# Patient Record
Sex: FEMALE | Race: WHITE | NOT HISPANIC OR LATINO | Employment: OTHER | URBAN - METROPOLITAN AREA
[De-identification: names, ages, dates, MRNs, and addresses within clinical notes are randomized per-mention and may not be internally consistent; named-entity substitution may affect disease eponyms.]

---

## 2023-11-15 NOTE — PROGRESS NOTES
Darcy Kwon female   1955 68 y.o.   [id number]      Chief Complaint  Annual mammogram and exam, BRCA 2 positive, high risk surveillance care    History Of Present Illness  Darcy Kwon is a very pleasant 68 year old  woman [facility]. 2023 she underwent genetic testing, 47 gene panel, demonstrating BRCA 2 gene mutation and a VUS in POLD1. She has family history of breast cancer in her mother, age 49. She denies breast surgery or biopsy. She is undecided on bilateral prophylactic mastectomy, but may consider in the future. She had an ICD placed in 2016 due to low cardiac fraction. She is unable to have an MRI. She follows with bilateral screening ultrasounds. She started Tamoxifen 20mg daily in 2023 for risk reduction, currently taking and tolerating well. She had a BSO, 2023, benign. She presents today for annual mammogram and exam. She denies any new masses or lumps.      BREAST IMAGIN2023 Bilateral screening ultrasound, indicates BI-RADS Category 1. 2022 [facility] Bilateral screening mammogram, indicates BI-RADS Category 1.      FEMALE HISTORY: menarche age 13, , first birth age 22, did not breastfeed, OCP's x 8 years, natural menopause age 54, BSO in 2023, benign, no HRT, almost entirely fatty tissue     FAMILY CANCER HISTORY:  Mother: Breast cancer, age 49  Maternal cousin: Breast cancer, BRCA2+  Father: Head and Neck cancer      Surgical History  She has a past surgical history that includes Other surgical history (2021); Other surgical history (2021); Other surgical history (2021); Other surgical history (2021); Other surgical history (2021); and Other surgical history (2021).     Social History  She has no history on file for tobacco use, alcohol use, and drug use.    Family History  Family History   Problem Relation Name Age of Onset    Breast cancer Mother       Ovarian cancer Mother      Breast cancer Mother's Sister          Allergies  Atorvastatin    Medications  Current Outpatient Medications   Medication Instructions    albuterol 90 mcg/actuation inhaler inhalation, Every 4 hours RT    Altace 10 mg capsule 1 capsule, oral, Daily    CALCIUM CARBONATE-VITAMIN D3 ORAL 1 tablet, oral, 2 times daily    carvedilol (Coreg) 12.5 mg tablet 1 tablet, oral, 2 times daily    cholecalciferol (Vitamin D-3) 25 MCG (1000 UT) tablet 1 tablet, oral, Daily    cycloSPORINE (Restasis) 0.05 % ophthalmic emulsion ophthalmic (eye), Every 12 hours    fluticasone propion-salmeteroL (Advair Diskus) 100-50 mcg/dose diskus inhaler 1 puff, inhalation, Every 12 hours    furosemide (Lasix) 20 mg tablet oral    glucosamine sulfate 500 mg capsule oral    magnesium oxide (Mag-Ox) 400 mg (241.3 mg magnesium) tablet 1 tablet, oral, Daily    mometasone (Nasonex) 50 mcg/actuation nasal spray nasal    multivitamin (Daily Multi-Vitamin) tablet 1 tablet, oral, Daily    omeprazole (PriLOSEC) 40 mg DR capsule oral    predniSONE (Deltasone) 10 mg tablet     rosuvastatin (Crestor) 10 mg tablet 1 tablet, oral, Daily    spironolactone (Aldactone) 25 mg tablet 1 tablet, oral, Daily         REVIEW OF SYSTEMS    Constitutional:  Negative for appetite change, fatigue, fever and unexpected weight change.   HENT:  Negative for ear pain, hearing loss, nosebleeds, sore throat and trouble swallowing.    Eyes:  Negative for discharge, itching and visual disturbance.   Respiratory:  Negative for cough, chest tightness and shortness of breath.    Cardiovascular:  Negative for chest pain, palpitations and leg swelling.   Breast: as indicated in HPI  Gastrointestinal:  Negative for abdominal pain, constipation, diarrhea and nausea.   Endocrine: Negative for cold intolerance and heat intolerance.   Genitourinary:  Negative for dysuria, frequency, hematuria, pelvic pain and vaginal bleeding.   Musculoskeletal:  Negative for  arthralgias, back pain, gait problem, joint swelling and myalgias.   Skin:  Negative for color change and rash.   Allergic/Immunologic: Negative for environmental allergies and food allergies.   Neurological:  Negative for dizziness, tremors, speech difficulty, weakness, numbness and headaches.   Hematological:  Does not bruise/bleed easily.   Psychiatric/Behavioral:  Negative for agitation, dysphoric mood and sleep disturbance. The patient is not nervous/anxious.         Past Medical History  She has no past medical history on file.     Physical Exam  Patient is alert and oriented x3 and in a relaxed and appropriate mood. Her gait is steady and hand grasps are equal. Sclera is clear. The breasts are nearly symmetrical. The tissue is dense in the central quadrants and softer around without palpable abnormalities, discrete nodules or masses. The skin and nipples appear normal. There is no cervical, supraclavicular or axillary lymphadenopathy. Heart rate and rhythm normal, S1 and S2 appreciated. The lungs are clear to auscultation bilaterally. Abdomen is soft and non-tender. There is a well-healed incision upper left chest wall with cardiac device in place.     Physical Exam  Chest:              Last Recorded Vitals  Vitals:    11/28/23 1152   BP: 133/81   Pulse: 85   Resp: 16       Relevant Results   Time was spent discussing digital images of the radiology testing with the patient. I explained the results in depth, along with suggested explanation for follow up recommendations based on the testing results. BI-RADS Category 0     Imaging  Narrative & Impression   Interpreted By:  Caroline Rincon,   STUDY:  BI MAMMO BILATERAL SCREENING TOMOSYNTHESIS;  11/28/2023 10:45 am      ACCESSION NUMBER(S):  [id number]      ORDERING CLINICIAN:  ORLANDO CERRATO      INDICATION:  Screening. BRCA2. Family history of breast and ovarian cancer.      COMPARISON:  None available. Priors have been requested.      FINDINGS:  2D and  tomosynthesis images were reviewed at 1 mm slice thickness.      Density:  There are areas of scattered fibroglandular tissue.      A cardiac device overlies the left axilla, limiting evaluation. There  are calcifications in the upper outer left breast at posterior depth.  No suspicious masses or calcifications are identified.      IMPRESSION:  No mammographic evidence of malignancy in the right breast. Left  breast calcifications.      BI-RADS CATEGORY:      BI-RADS Category:  0 Incomplete; Need Additional Imaging Evaluation  and/or Prior Mammograms for Comparison. Recommendation:  Additional  Imaging Diagnostic Mammogram. Recommended Date:  Immediate.  Laterality:  Left.     Narrative & Impression   Interpreted By:  Caroline Rincon,   STUDY:  BI MAMMO LEFT DIAGNOSTIC;  11/28/2023 11:43 am      ACCESSION NUMBER(S):  [id number]      ORDERING CLINICIAN:  ORLANDO CERRATO      INDICATION:  Recall from same day screening mammogram for left breast  calcifications.      COMPARISON:  None available. Priors have been requested.      FINDINGS:  Density:  There are areas of scattered fibroglandular tissue.      There are loosely grouped round calcifications in the upper outer  quadrant at posterior depth. No suspicious masses or calcifications  are identified.      IMPRESSION:  Left breast calcifications. Recommendation is for comparison with  outside prior mammograms when available.      BI-RADS CATEGORY:      BI-RADS Category:  0 Incomplete; Need Additional Imaging Evaluation  and/or Prior Mammograms for Comparison. Recommendation:  Obtain Prior  Study for Comparison. Recommended Date:  Immediate.  Laterality:  Left.       Assessment/Plan   High risk surveillance care, normal clinical exam and imaging, BRCA 2 positive, VUS in POLD1 gene, family history of breast cancer, almost entirely fatty tissue, but centrally dense on exam     Plan: Return in July 2024 for bilateral screening ultrasound and office visit. Will  alternate screening ultrasound/mammogram due to ICD. Continue Tamoxifen 20mg daily. Will call once addendum is placed.     Patient Discussion/Summary  Your clinical examination and imaging are stable. Please return in July 2024 for bilateral screening ultrasound and office visit or sooner if you have any problems or concerns. Continue Tamoxifen 20mg daily. I will call you with the recommendations once we have received your outside breast imaging.     You can see your health information, review clinical summaries from office visits & test results online when you follow your health with MY  Chart, a personal health record. To sign up go to [email or web address]. If you need assistance with signing up or trouble getting into your account call [employer] Patient Line 24/7 at [phone].    My office phone number is [phone] if you need to get in touch with me or have additional questions or concerns. Thank you for choosing [facility] and trusting me as your healthcare provider. I look forward to seeing you again at your next office visit. I am honored to be a provider on your health care team and I remain dedicated to helping you achieve your health goals.      Olive Rocha, APRN-CNP

## 2023-11-16 PROBLEM — R06.02 SHORTNESS OF BREATH: Status: ACTIVE | Noted: 2023-11-16

## 2023-11-16 PROBLEM — E78.5 DYSLIPIDEMIA (HIGH LDL; LOW HDL): Status: ACTIVE | Noted: 2023-11-16

## 2023-11-16 PROBLEM — T82.198A MECHANICAL COMPLICATION OF IMPLANTED CARDIAC DEFIBRILLATOR: Status: ACTIVE | Noted: 2023-11-16

## 2023-11-16 PROBLEM — I42.0 DILATED CARDIOMYOPATHY (MULTI): Status: ACTIVE | Noted: 2023-11-16

## 2023-11-16 PROBLEM — Z95.810 ICD (IMPLANTABLE CARDIOVERTER-DEFIBRILLATOR), BIVENTRICULAR, IN SITU: Status: ACTIVE | Noted: 2023-11-16

## 2023-11-16 PROBLEM — E66.9 OBESITY: Status: ACTIVE | Noted: 2023-11-16

## 2023-11-16 PROBLEM — U07.1 DISEASE DUE TO SEVERE ACUTE RESPIRATORY SYNDROME CORONAVIRUS 2 (SARS-COV-2): Status: ACTIVE | Noted: 2023-11-16

## 2023-11-16 PROBLEM — I50.9 CHF NYHA CLASS III (MULTI): Status: ACTIVE | Noted: 2023-11-16

## 2023-11-16 PROBLEM — I44.7 LBBB (LEFT BUNDLE BRANCH BLOCK): Status: ACTIVE | Noted: 2023-11-16

## 2023-11-16 RX ORDER — FLUTICASONE PROPIONATE AND SALMETEROL 100; 50 UG/1; UG/1
1 POWDER RESPIRATORY (INHALATION) EVERY 12 HOURS
COMMUNITY
End: 2023-12-12 | Stop reason: ALTCHOICE

## 2023-11-16 RX ORDER — CHOLECALCIFEROL (VITAMIN D3) 25 MCG
1 TABLET ORAL DAILY
COMMUNITY

## 2023-11-16 RX ORDER — RAMIPRIL 10 MG/1
1 CAPSULE ORAL DAILY
COMMUNITY

## 2023-11-16 RX ORDER — ROSUVASTATIN CALCIUM 10 MG/1
1 TABLET, COATED ORAL DAILY
COMMUNITY
End: 2024-06-10 | Stop reason: SDUPTHER

## 2023-11-16 RX ORDER — CARVEDILOL 12.5 MG/1
1 TABLET ORAL 2 TIMES DAILY
COMMUNITY
Start: 2022-04-01 | End: 2024-06-10 | Stop reason: SDUPTHER

## 2023-11-16 RX ORDER — PETROLATUM,WHITE/LANOLIN
OINTMENT (GRAM) TOPICAL
COMMUNITY
End: 2023-11-28 | Stop reason: WASHOUT

## 2023-11-16 RX ORDER — CYCLOSPORINE 0.5 MG/ML
EMULSION OPHTHALMIC EVERY 12 HOURS
COMMUNITY
End: 2023-12-12 | Stop reason: WASHOUT

## 2023-11-16 RX ORDER — MOMETASONE FUROATE 50 UG/1
SPRAY, METERED NASAL
COMMUNITY

## 2023-11-16 RX ORDER — ALBUTEROL SULFATE 90 UG/1
AEROSOL, METERED RESPIRATORY (INHALATION)
COMMUNITY

## 2023-11-16 RX ORDER — FUROSEMIDE 20 MG/1
TABLET ORAL
COMMUNITY
End: 2024-06-10 | Stop reason: SDUPTHER

## 2023-11-16 RX ORDER — LANOLIN ALCOHOL/MO/W.PET/CERES
1 CREAM (GRAM) TOPICAL DAILY
COMMUNITY
Start: 2022-06-26 | End: 2024-04-25

## 2023-11-16 RX ORDER — MULTIVITAMIN
1 TABLET ORAL DAILY
COMMUNITY

## 2023-11-16 RX ORDER — OMEPRAZOLE 40 MG/1
CAPSULE, DELAYED RELEASE ORAL
COMMUNITY

## 2023-11-16 RX ORDER — PREDNISONE 10 MG/1
TABLET ORAL
COMMUNITY
Start: 2023-03-29 | End: 2023-11-28 | Stop reason: WASHOUT

## 2023-11-16 RX ORDER — SPIRONOLACTONE 25 MG/1
1 TABLET ORAL DAILY
COMMUNITY
Start: 2021-10-07 | End: 2024-06-10 | Stop reason: SDUPTHER

## 2023-11-28 ENCOUNTER — HOSPITAL ENCOUNTER (OUTPATIENT)
Dept: RADIOLOGY | Facility: HOSPITAL | Age: 68
Discharge: HOME | End: 2023-11-28
Payer: MEDICARE

## 2023-11-28 ENCOUNTER — OFFICE VISIT (OUTPATIENT)
Dept: SURGICAL ONCOLOGY | Facility: HOSPITAL | Age: 68
End: 2023-11-28
Payer: MEDICARE

## 2023-11-28 VITALS
BODY MASS INDEX: 32.2 KG/M2 | HEART RATE: 85 BPM | RESPIRATION RATE: 16 BRPM | WEIGHT: 175 LBS | SYSTOLIC BLOOD PRESSURE: 133 MMHG | HEIGHT: 62 IN | DIASTOLIC BLOOD PRESSURE: 81 MMHG

## 2023-11-28 VITALS — HEIGHT: 61 IN | BODY MASS INDEX: 35.5 KG/M2 | WEIGHT: 188 LBS

## 2023-11-28 DIAGNOSIS — Z15.01 GENETIC SUSCEPTIBILITY TO MALIGNANT NEOPLASM OF BREAST: ICD-10-CM

## 2023-11-28 DIAGNOSIS — Z12.39 ENCOUNTER FOR OTHER SCREENING FOR MALIGNANT NEOPLASM OF BREAST: ICD-10-CM

## 2023-11-28 DIAGNOSIS — R92.8 ABNORMAL MAMMOGRAM: ICD-10-CM

## 2023-11-28 DIAGNOSIS — Z15.09 GENETIC SUSCEPTIBILITY TO OTHER MALIGNANT NEOPLASM: ICD-10-CM

## 2023-11-28 DIAGNOSIS — R92.1 CALCIFICATION OF LEFT BREAST: ICD-10-CM

## 2023-11-28 DIAGNOSIS — Z12.39 BREAST CANCER SCREENING, HIGH RISK PATIENT: Primary | ICD-10-CM

## 2023-11-28 PROCEDURE — 99213 OFFICE O/P EST LOW 20 MIN: CPT | Mod: PO | Performed by: NURSE PRACTITIONER

## 2023-11-28 PROCEDURE — 77063 BREAST TOMOSYNTHESIS BI: CPT

## 2023-11-28 PROCEDURE — 1159F MED LIST DOCD IN RCRD: CPT | Performed by: NURSE PRACTITIONER

## 2023-11-28 PROCEDURE — 77067 SCR MAMMO BI INCL CAD: CPT | Mod: BILATERAL PROCEDURE | Performed by: RADIOLOGY

## 2023-11-28 PROCEDURE — 77063 BREAST TOMOSYNTHESIS BI: CPT | Mod: BILATERAL PROCEDURE | Performed by: RADIOLOGY

## 2023-11-28 PROCEDURE — 77067 SCR MAMMO BI INCL CAD: CPT

## 2023-11-28 PROCEDURE — 99213 OFFICE O/P EST LOW 20 MIN: CPT | Performed by: NURSE PRACTITIONER

## 2023-11-28 PROCEDURE — 77065 DX MAMMO INCL CAD UNI: CPT | Mod: LT

## 2023-11-28 PROCEDURE — 77065 DX MAMMO INCL CAD UNI: CPT | Mod: LEFT SIDE | Performed by: RADIOLOGY

## 2023-11-28 NOTE — PATIENT INSTRUCTIONS
Your clinical examination and imaging are stable. Please return in July 2024 for bilateral screening ultrasound and office visit or sooner if you have any problems or concerns. Continue Tamoxifen 20mg daily. I will call you with the recommendations once we have received your outside breast imaging.     You can see your health information, review clinical summaries from office visits & test results online when you follow your health with MY  Chart, a personal health record. To sign up go to [email or web address]. If you need assistance with signing up or trouble getting into your account call [employer] Patient Line 24/7 at [phone].    My office phone number is [phone] if you need to get in touch with me or have additional questions or concerns. Thank you for choosing [facility] and trusting me as your healthcare provider. I look forward to seeing you again at your next office visit. I am honored to be a provider on your health care team and I remain dedicated to helping you achieve your health goals.

## 2023-12-04 ENCOUNTER — HOSPITAL ENCOUNTER (OUTPATIENT)
Dept: RADIOLOGY | Facility: EXTERNAL LOCATION | Age: 68
Discharge: HOME | End: 2023-12-04

## 2023-12-05 ENCOUNTER — TELEPHONE (OUTPATIENT)
Dept: SURGERY | Facility: HOSPITAL | Age: 68
End: 2023-12-05
Payer: MEDICARE

## 2023-12-05 NOTE — TELEPHONE ENCOUNTER
Spoke with Darcy regarding addendum to mammogram results. Left breast biopsy recommended. She is scheduled for 12/14/2023.

## 2023-12-12 ENCOUNTER — HOSPITAL ENCOUNTER (OUTPATIENT)
Dept: CARDIOLOGY | Facility: HOSPITAL | Age: 68
Discharge: HOME | End: 2023-12-12
Payer: MEDICARE

## 2023-12-12 ENCOUNTER — OFFICE VISIT (OUTPATIENT)
Dept: CARDIOLOGY | Facility: CLINIC | Age: 68
End: 2023-12-12
Payer: MEDICARE

## 2023-12-12 VITALS
DIASTOLIC BLOOD PRESSURE: 76 MMHG | SYSTOLIC BLOOD PRESSURE: 126 MMHG | HEART RATE: 70 BPM | WEIGHT: 189 LBS | HEIGHT: 62 IN | BODY MASS INDEX: 34.78 KG/M2

## 2023-12-12 DIAGNOSIS — Z95.810 ICD (IMPLANTABLE CARDIOVERTER-DEFIBRILLATOR), BIVENTRICULAR, IN SITU: Primary | ICD-10-CM

## 2023-12-12 DIAGNOSIS — I47.29 PAROXYSMAL VENTRICULAR TACHYCARDIA (MULTI): ICD-10-CM

## 2023-12-12 DIAGNOSIS — Z01.810 PRE-OPERATIVE CARDIOVASCULAR EXAMINATION, ICD IN PLACE: ICD-10-CM

## 2023-12-12 DIAGNOSIS — Z71.89 ENCOUNTER TO DISCUSS TREATMENT OPTIONS: ICD-10-CM

## 2023-12-12 DIAGNOSIS — I42.0 DILATED CARDIOMYOPATHY (MULTI): ICD-10-CM

## 2023-12-12 DIAGNOSIS — I42.8 NONISCHEMIC CARDIOMYOPATHY (MULTI): ICD-10-CM

## 2023-12-12 DIAGNOSIS — Z95.810 PRE-OPERATIVE CARDIOVASCULAR EXAMINATION, ICD IN PLACE: ICD-10-CM

## 2023-12-12 DIAGNOSIS — E78.5 DYSLIPIDEMIA (HIGH LDL; LOW HDL): ICD-10-CM

## 2023-12-12 DIAGNOSIS — I44.7 LBBB (LEFT BUNDLE BRANCH BLOCK): ICD-10-CM

## 2023-12-12 DIAGNOSIS — Z71.89 ENCOUNTER FOR MEDICATION REVIEW AND COUNSELING: ICD-10-CM

## 2023-12-12 DIAGNOSIS — Z87.891 FORMER SMOKER: ICD-10-CM

## 2023-12-12 DIAGNOSIS — I50.9 CONGESTIVE HEART FAILURE, NYHA CLASS 3, UNSPECIFIED CONGESTIVE HEART FAILURE TYPE (MULTI): ICD-10-CM

## 2023-12-12 PROCEDURE — 93290 INTERROG DEV EVAL ICPMS IP: CPT | Performed by: INTERNAL MEDICINE

## 2023-12-12 PROCEDURE — 93000 ELECTROCARDIOGRAM COMPLETE: CPT | Performed by: INTERNAL MEDICINE

## 2023-12-12 PROCEDURE — 1159F MED LIST DOCD IN RCRD: CPT | Performed by: INTERNAL MEDICINE

## 2023-12-12 PROCEDURE — 1036F TOBACCO NON-USER: CPT | Performed by: INTERNAL MEDICINE

## 2023-12-12 PROCEDURE — 99214 OFFICE O/P EST MOD 30 MIN: CPT | Performed by: INTERNAL MEDICINE

## 2023-12-12 PROCEDURE — 93283 PRGRMG EVAL IMPLANTABLE DFB: CPT | Performed by: INTERNAL MEDICINE

## 2023-12-12 PROCEDURE — 93283 PRGRMG EVAL IMPLANTABLE DFB: CPT

## 2023-12-12 PROCEDURE — 3008F BODY MASS INDEX DOCD: CPT | Performed by: INTERNAL MEDICINE

## 2023-12-12 RX ORDER — TAMOXIFEN CITRATE 20 MG/1
20 TABLET ORAL DAILY
COMMUNITY

## 2023-12-12 ASSESSMENT — ENCOUNTER SYMPTOMS
PALPITATIONS: 0
DYSPNEA ON EXERTION: 0
WHEEZING: 0

## 2023-12-12 NOTE — PROGRESS NOTES
"Chief Complaint:   Follow-up (6 month w/ device check)     History Of Present Illness:    Darcy Kwon is a 68 y.o. female presenting with follow-up.     She feels okay.  She denies any cardiac symptoms.  She denies any lightheadedness, near-syncope, syncope, or chest discomfort.    We discussed that her device is nearing replacement indicator.  We did review what future generator change involves.    Last Recorded Vitals:  Vitals:    12/12/23 1422   BP: 126/76   BP Location: Left arm   Patient Position: Sitting   Pulse: 70   Weight: 85.7 kg (189 lb)   Height: 1.575 m (5' 2\")       Past Medical History:  See List    Past Surgical History:  See List      Social History:  She reports that she has quit smoking. Her smoking use included cigarettes. She has never used smokeless tobacco. She reports current alcohol use. She reports that she does not use drugs.    Family History:  Family History   Problem Relation Name Age of Onset    Breast cancer Mother      Ovarian cancer Mother      Breast cancer Mother's Sister          Allergies:  Atorvastatin    Outpatient Medications:  Current Outpatient Medications   Medication Instructions    albuterol 90 mcg/actuation inhaler inhalation, Every 4 hours RT    Altace 10 mg capsule 1 capsule, oral, Daily    CALCIUM CARBONATE-VITAMIN D3 ORAL 1 tablet, oral, 2 times daily    carvedilol (Coreg) 12.5 mg tablet 1 tablet, oral, 2 times daily    cholecalciferol (Vitamin D-3) 25 MCG (1000 UT) tablet 1 tablet, oral, Daily    fluticasone propion-salmeteroL (Advair Diskus) 100-50 mcg/dose diskus inhaler 1 puff, inhalation, Every 12 hours    furosemide (Lasix) 20 mg tablet oral    magnesium oxide (Mag-Ox) 400 mg (241.3 mg magnesium) tablet 1 tablet, oral, Daily    mometasone (Nasonex) 50 mcg/actuation nasal spray nasal    multivitamin (Daily Multi-Vitamin) tablet 1 tablet, oral, Daily    omeprazole (PriLOSEC) 40 mg DR capsule oral    rosuvastatin (Crestor) 10 mg tablet 1 tablet, oral, Daily    " "spironolactone (Aldactone) 25 mg tablet 1 tablet, oral, Daily    tamoxifen (NOLVADEX) 20 mg, oral, Daily, Take with water or any other nonalcoholic drink with or without food at around the same time(s) every day.   Review of Systems   Cardiovascular:  Negative for chest pain, dyspnea on exertion and palpitations.   Respiratory:  Negative for wheezing.    All other systems reviewed and are negative.    Physical Exam:  Constitutional:       General: Awake.      Appearance: Normal and healthy appearance. Well-developed and not in distress.   Neck:      Vascular: No JVR. JVD normal.   Pulmonary:      Effort: Pulmonary effort is normal.      Breath sounds: Normal breath sounds. No wheezing. No rhonchi. No rales.   Chest:      Chest wall: Not tender to palpatation.      Comments: Left sided device pocket- healed and well approximated. No lump or hematoma     Cardiovascular:      PMI at left midclavicular line. Normal rate. Regular rhythm. Normal S1. Normal S2.       Murmurs: There is no murmur.      No gallop.  No click. No rub.   Pulses:     Intact distal pulses.   Edema:     Peripheral edema absent.   Abdominal:      Tenderness: There is no abdominal tenderness.   Musculoskeletal: Normal range of motion.         General: No tenderness. Skin:     General: Skin is warm and dry.   Neurological:      General: No focal deficit present.      Mental Status: Alert and oriented to person, place and time.            Last Labs:  CBC -  No results found for: \"WBC\", \"HGB\", \"HCT\", \"MCV\", \"PLT\"    CMP -  No results found for: \"CALCIUM\", \"PHOS\", \"PROT\", \"ALBUMIN\", \"AST\", \"ALT\", \"ALKPHOS\", \"BILITOT\"    LIPID PANEL -   No results found for: \"CHOL\", \"TRIG\", \"HDL\", \"CHHDL\", \"LDLF\", \"VLDL\", \"NHDL\"    RENAL FUNCTION PANEL -   No results found for: \"GLUCOSE\", \"NA\", \"K\", \"CL\", \"CO2\", \"ANIONGAP\", \"BUN\", \"CREATININE\", \"GFRMALE\", \"CALCIUM\", \"PHOS\", \"ALBUMIN\"     No results found for: \"BNP\", \"HGBA1C\"    Last Cardiology Tests:       Device Check: " Today. Medtronic DTM B1 QQ biventricular ICD. Estimate longevity device 7 months. Medtronic alert device. 99.9 % biventricular pacing.  0 VTECG:  ECG: Today. Appropriate sensing and pacing.  Normal axis.  Corrected QT interval 490 ms       Lab review: I have personally reviewed the laboratory result(s) see above    Assessment/Plan   Diagnoses and all orders for this visit:  Dilated cardiomyopathy (CMS/HCC)  LBBB (left bundle branch block)  ICD (implantable cardioverter-defibrillator), biventricular, in situ  Dyslipidemia (high LDL; low HDL)  Congestive heart failure, NYHA class 3, unspecified congestive heart failure type (CMS/HCC)  Former smoker  BMI 34.0-34.9,adult  Encounter for medication review and counseling  Encounter to discuss treatment options      Refractory heart failure, primary prevention ICD, underlying left bundle-branch block and sinus node dysfunction, s/p biventricular ICD in 2016. Chronic. Stable. Marked improvement in chronic systolic heart failure symptoms after biv ICD. July 2021 stress nuclear test with LVEF 54%.  Medtronic [id number] CRT-D, Field alert device. Reviewed device check. Previously adjusted AV VV optimization by echocardiographic guidance. Normal Optivol.  Changed to monthly device checks.  Discussed eventual future generator change.  Will have patient see Daxa Parrish NP in a few months to assess for generator replacement.    Chronic systolic heart failure . NYHA II C HF. Chronic. Stable. Reviewed medications. Minimal symptoms with current optimal HF meds. Continue meds.  Discussed refill  Hypertension, benign, chronic, controlled. Stable. Reviewed medications.  Discussed refills  Hyperlipidemia. Chronic. Stable. Reviewed medications. Reviewed last blood work.  History of atypical throat and chest pain, normal perfusion by nuclear stress test 2021. Chronic. Stable.  History of vein ligation. Chronic right lower extremity edema. Stable. Chronic.  Overweight. Reviewed diet  and exercise. Reinforced behavior modification        AHA recommendations for exercise, diet, and behavioral modification reviewed with pt.     The patient and I discussed the mechanism of arrhythmia, ventricular device, device function, field alert, ECG, device nearing MARY, uses sufficiency, compression stockings which she declines, indications for and types of medications, discussion if and what medication refills needed, treatment options, risks, benefits, and imponderables. American Heart Association lifestyle changes and behavioral modification discussed. All questions answered in detail. Counseling over 50% visit regarding above. Patient appreciative of care.       Yareli Ochoa MD

## 2023-12-12 NOTE — PATIENT INSTRUCTIONS
Continue same medications/treatment.  Patient educated on proper medication use.  Patient educated on risk factor modification.  Please bring any lab results from other providers/physicians to your next appointment.    Please bring all medicines, vitamins, and herbal supplements with you when you come to the office.    Prescriptions will not be filled unless you are compliant with your follow up appointments or have a follow up appointment scheduled as per instruction of your physician. Refills should be requested at the time of your visit.    Follow up with Daxa in 4 months with device check  Follow up with Dr. Ingram in 12 months with device check  Continue remote checks every 2 months until replacement     I, EUGENIO SADLER RN, AM SCRIBING FOR AND IN THE PRESENCE OF DR. JOSE INGRAM MD, FACC, FACP, FHPS

## 2023-12-14 ENCOUNTER — APPOINTMENT (OUTPATIENT)
Dept: SURGICAL ONCOLOGY | Facility: HOSPITAL | Age: 68
End: 2023-12-14
Payer: MEDICARE

## 2023-12-14 ENCOUNTER — HOSPITAL ENCOUNTER (OUTPATIENT)
Dept: RADIOLOGY | Facility: HOSPITAL | Age: 68
Discharge: HOME | End: 2023-12-14
Payer: MEDICARE

## 2023-12-14 DIAGNOSIS — R92.8 ABNORMAL MAMMOGRAM: ICD-10-CM

## 2023-12-14 PROCEDURE — 88305 TISSUE EXAM BY PATHOLOGIST: CPT | Mod: TC,SUR,STJLAB | Performed by: SURGERY

## 2023-12-14 PROCEDURE — 77065 DX MAMMO INCL CAD UNI: CPT | Mod: LT

## 2023-12-14 PROCEDURE — 2720000007 HC OR 272 NO HCPCS

## 2023-12-14 PROCEDURE — 19081 BX BREAST 1ST LESION STRTCTC: CPT | Mod: LEFT SIDE | Performed by: RADIOLOGY

## 2023-12-14 PROCEDURE — 2500000005 HC RX 250 GENERAL PHARMACY W/O HCPCS: Performed by: RADIOLOGY

## 2023-12-14 PROCEDURE — 19081 BX BREAST 1ST LESION STRTCTC: CPT | Mod: LT

## 2023-12-14 PROCEDURE — 96372 THER/PROPH/DIAG INJ SC/IM: CPT | Performed by: RADIOLOGY

## 2023-12-14 PROCEDURE — 88305 TISSUE EXAM BY PATHOLOGIST: CPT | Performed by: PATHOLOGY

## 2023-12-14 PROCEDURE — 2780000003 HC OR 278 NO HCPCS

## 2023-12-14 PROCEDURE — 77065 DX MAMMO INCL CAD UNI: CPT | Mod: LEFT SIDE | Performed by: RADIOLOGY

## 2023-12-14 PROCEDURE — A4648 IMPLANTABLE TISSUE MARKER: HCPCS

## 2023-12-14 RX ADMIN — Medication 10 ML: at 14:27

## 2023-12-19 ENCOUNTER — TELEPHONE (OUTPATIENT)
Dept: SURGERY | Facility: HOSPITAL | Age: 68
End: 2023-12-19
Payer: MEDICARE

## 2023-12-19 LAB
LABORATORY COMMENT REPORT: NORMAL
PATH REPORT.FINAL DX SPEC: NORMAL
PATH REPORT.GROSS SPEC: NORMAL
PATH REPORT.RELEVANT HX SPEC: NORMAL
PATH REPORT.TOTAL CANCER: NORMAL

## 2023-12-19 NOTE — TELEPHONE ENCOUNTER
Spoke with Darcy regarding left breast biopsy results, benign. Awaiting concordance report, patient aware. Will return in July 2024 as previously recommended and scheduled. She was told she can move forward with MRI despite her ICD. We will discuss again at next visit.

## 2024-01-16 ENCOUNTER — HOSPITAL ENCOUNTER (OUTPATIENT)
Dept: CARDIOLOGY | Facility: HOSPITAL | Age: 69
Discharge: HOME | End: 2024-01-16
Payer: MEDICARE

## 2024-01-16 DIAGNOSIS — Z95.810 ICD (IMPLANTABLE CARDIOVERTER-DEFIBRILLATOR), BIVENTRICULAR, IN SITU: ICD-10-CM

## 2024-02-20 ENCOUNTER — HOSPITAL ENCOUNTER (OUTPATIENT)
Dept: CARDIOLOGY | Facility: HOSPITAL | Age: 69
Discharge: HOME | End: 2024-02-20
Payer: MEDICARE

## 2024-02-20 DIAGNOSIS — Z95.810 ICD (IMPLANTABLE CARDIOVERTER-DEFIBRILLATOR), BIVENTRICULAR, IN SITU: ICD-10-CM

## 2024-04-09 ENCOUNTER — OFFICE VISIT (OUTPATIENT)
Dept: CARDIOLOGY | Facility: CLINIC | Age: 69
End: 2024-04-09
Payer: MEDICARE

## 2024-04-09 ENCOUNTER — HOSPITAL ENCOUNTER (OUTPATIENT)
Dept: CARDIOLOGY | Facility: HOSPITAL | Age: 69
Discharge: HOME | End: 2024-04-09
Payer: MEDICARE

## 2024-04-09 VITALS
HEART RATE: 73 BPM | BODY MASS INDEX: 34.6 KG/M2 | DIASTOLIC BLOOD PRESSURE: 68 MMHG | WEIGHT: 188 LBS | SYSTOLIC BLOOD PRESSURE: 108 MMHG | HEIGHT: 62 IN

## 2024-04-09 DIAGNOSIS — Z87.891 FORMER SMOKER: ICD-10-CM

## 2024-04-09 DIAGNOSIS — I50.9 CONGESTIVE HEART FAILURE, NYHA CLASS 3, UNSPECIFIED CONGESTIVE HEART FAILURE TYPE (MULTI): ICD-10-CM

## 2024-04-09 DIAGNOSIS — I44.7 LBBB (LEFT BUNDLE BRANCH BLOCK): ICD-10-CM

## 2024-04-09 DIAGNOSIS — R06.02 SHORTNESS OF BREATH: ICD-10-CM

## 2024-04-09 DIAGNOSIS — Z95.810 ICD (IMPLANTABLE CARDIOVERTER-DEFIBRILLATOR), BIVENTRICULAR, IN SITU: ICD-10-CM

## 2024-04-09 DIAGNOSIS — I42.0 DILATED CARDIOMYOPATHY (MULTI): ICD-10-CM

## 2024-04-09 DIAGNOSIS — E78.5 DYSLIPIDEMIA (HIGH LDL; LOW HDL): ICD-10-CM

## 2024-04-09 DIAGNOSIS — Z95.810 ELECTIVE REPLACEMENT OF IMPLANTABLE CARDIOVERTER-DEFIBRILLATOR (ICD) BATTERY REQUIRED: Primary | ICD-10-CM

## 2024-04-09 PROCEDURE — 93284 PRGRMG EVAL IMPLANTABLE DFB: CPT | Performed by: INTERNAL MEDICINE

## 2024-04-09 PROCEDURE — 99214 OFFICE O/P EST MOD 30 MIN: CPT | Performed by: NURSE PRACTITIONER

## 2024-04-09 PROCEDURE — 1036F TOBACCO NON-USER: CPT | Performed by: NURSE PRACTITIONER

## 2024-04-09 PROCEDURE — 3008F BODY MASS INDEX DOCD: CPT | Performed by: NURSE PRACTITIONER

## 2024-04-09 PROCEDURE — 1160F RVW MEDS BY RX/DR IN RCRD: CPT | Performed by: NURSE PRACTITIONER

## 2024-04-09 PROCEDURE — 1159F MED LIST DOCD IN RCRD: CPT | Performed by: NURSE PRACTITIONER

## 2024-04-09 PROCEDURE — 93284 PRGRMG EVAL IMPLANTABLE DFB: CPT

## 2024-04-09 NOTE — PATIENT INSTRUCTIONS
AV Biventricular ICD generator change with Dr. Ochoa in June, 2024.  Hold lasix the day of the procedure.  Take all other medications as prescribed the day of the procedure with a sip of water.  No food to eat or drink after midnight the day of the procedure.    Daxa [phone]

## 2024-04-09 NOTE — PROGRESS NOTES
"CARDIOLOGY OFFICE VISIT      CHIEF COMPLAINT  Chief Complaint   Patient presents with    Follow-up     4 month with device check   Chief complaint: \"I have really been doing well but I have been working hard to improve my health.\"    HISTORY OF PRESENT ILLNESS  HPI  History: The patient is a 68-year-old  female who is followed for refractory heart failure status post AV biventricular ICD implant on 2016 ([employer] amply a MRI Quad CRT-D) for the treatment of refractory heart failure and primary prevention of sudden cardiac death.  Patient's most recent ejection fraction was 54% per nuclear stress test dated .  She has a history of hypertension, hyperlipidemia, and vein ligation of the right lower extremity with ongoing lower extremity swelling.  She denies chest pain, palpitations, dizziness, lightheadedness, shortness of breath, abdominal distention, or lower extremity edema.  She reports that she is working very hard on her lifestyle modifications to get some weight off.  She is made some major dietary changes, moving towards a Mediterranean diet.  Past Medical History  Past Medical History:   Diagnosis Date    Asthma     CHF (congestive heart failure) (CMS/[facility])        Social History  Social History     Tobacco Use    Smoking status: Former     Packs/day: 0.25     Years: 6.00     Additional pack years: 0.00     Total pack years: 1.50     Types: Cigarettes     Start date: 1971     Quit date: 1977     Years since quittin.8    Smokeless tobacco: Never   Substance Use Topics    Alcohol use: Yes     Comment: Occ drink with dinner outing    Drug use: Never       Family History     Family History   Problem Relation Name Age of Onset    Breast cancer Mother      Ovarian cancer Mother      Breast cancer Mother's Sister      Cancer Father Dheeraj Santana     Heart disease Father Dheeraj Santana         Allergies:  Allergies   Allergen Reactions    Atorvastatin Myalgia    "     Outpatient Medications:  Current Outpatient Medications   Medication Instructions    albuterol 90 mcg/actuation inhaler inhalation, Every 4 hours RT    Altace 10 mg capsule 1 capsule, oral, Daily    CALCIUM CARBONATE-VITAMIN D3 ORAL 1 tablet, oral, 2 times daily    carvedilol (Coreg) 12.5 mg tablet 1 tablet, oral, 2 times daily    cholecalciferol (Vitamin D-3) 25 MCG (1000 UT) tablet 1 tablet, oral, Daily    furosemide (Lasix) 20 mg tablet oral    magnesium oxide (Mag-Ox) 400 mg (241.3 mg magnesium) tablet 1 tablet, oral, Daily    mometasone (Nasonex) 50 mcg/actuation nasal spray nasal    multivitamin (Daily Multi-Vitamin) tablet 1 tablet, oral, Daily    omeprazole (PriLOSEC) 40 mg DR capsule oral    rosuvastatin (Crestor) 10 mg tablet 1 tablet, oral, Daily    spironolactone (Aldactone) 25 mg tablet 1 tablet, oral, Daily    tamoxifen (NOLVADEX) 20 mg, oral, Daily, Take with water or any other nonalcoholic drink with or without food at around the same time(s) every day.          REVIEW OF SYSTEMS  Review of Systems   All other systems reviewed and are negative.        VITALS  Vitals:    04/09/24 1346   BP: 108/68   Pulse: 73       PHYSICAL EXAM  Vitals and nursing note reviewed.   Constitutional:       Appearance: Normal appearance.   HENT:      Head: Normocephalic.   Neck:      Vascular: No JVD.   Cardiovascular:      Rate and Rhythm: Normal rate and regular rhythm.      Pulses: Normal pulses.      Heart sounds: Normal heart sounds.   Pulmonary:      Effort: Pulmonary effort is normal.      Breath sounds: Normal breath sounds.   Abdominal:      General: Bowel sounds are normal.      Palpations: Abdomen is soft.   Musculoskeletal:         General: Normal range of motion.      Cervical back: Normal range of motion.   Skin:     General: Skin is warm and dry.  Left subclavian ICD pocket is well-healed without redness swelling or drainage.  Neurological:      General: No focal deficit present.      Mental Status: She  is alert and oriented to person, place, and time.      Motor: Motor function is intact.   Psychiatric:         Attention and Perception: Attention and perception normal.         Mood and Affect: Mood and affect normal.         Speech: Speech normal.         Behavior: Behavior normal. Behavior is cooperative.         Thought Content: Thought content normal.         Cognition and Memory: Cognition and memory normal.        Labs and testing: Twelve-lead EKG reveals atrial sensing and ventricular pacing at 73 bpm.  QRS durations 138 ms,  ms, QTc 482 ms.  ICD interrogation dated April 9, 2024 reveals atrial pacing 14.1% and biventricular pacing 99.9%.  Good sensing and capture thresholds.  1 nonsustained VT detection was noted on February 9, 2024 lasting 1 second at a rate of 188 bpm.  No additional arrhythmic events were noted.  The device is on field alert due to early rapid battery depletion.  Estimated battery longevity is 4 months.    ASSESSMENT AND PLAN    Clinical impressions:  1.  Refractory heart failure status with left bundle branch block and sinus node dysfunction status post AV biventricular ICD implant 20th, 2016 (Butlr amp via MRI Quad CRT-D SD) nearing elective replacement indicator and on field alert for potential early battery depletion.  2.  Dyslipidemia on statin.  3.  Hypertension, controlled with a blood pressure today of 108/68.  4.  Left ventricular ejection fraction of 54% per nuclear stress test dated July, 2021.  5.  Remote vein ligation with chronic bilateral lower extremity edema.  6.  Remote tobacco use.  7.  Class I obesity with a BMI 34.39.    Recommendations:  1.  Continue current medications as prescribed.  2.  The patient reports that they are planning on traveling this summer.  Review of the trends with the battery the device should reached elective replacement indicator by June, 2024.  Arrangements will be made for an outpatient ICD generator change in June.  Patient will  hold the furosemide the day of the procedure and take all other medications as prescribed the day of the procedure with small sip of water.  No food to eat or drink after midnight the day of the procedure.  3.  Follow-ups will be pending the clinical course.  4.  The procedure for generator change was reviewed in detail.  All questions were answered.    Evaluation and note by Daxa Harris CNP  **Please excuse any errors in grammar or translation related to this dictation.  Voice recognition software was utilized to prepare this document.**

## 2024-04-25 DIAGNOSIS — Z00.00 HEALTHCARE MAINTENANCE: ICD-10-CM

## 2024-04-25 RX ORDER — LANOLIN ALCOHOL/MO/W.PET/CERES
1 CREAM (GRAM) TOPICAL DAILY
Qty: 90 TABLET | Refills: 3 | Status: SHIPPED | OUTPATIENT
Start: 2024-04-25 | End: 2024-06-10 | Stop reason: SDUPTHER

## 2024-05-15 ENCOUNTER — HOSPITAL ENCOUNTER (OUTPATIENT)
Dept: CARDIOLOGY | Facility: HOSPITAL | Age: 69
Discharge: HOME | End: 2024-05-15
Payer: MEDICARE

## 2024-05-15 DIAGNOSIS — Z95.810 ICD (IMPLANTABLE CARDIOVERTER-DEFIBRILLATOR), BIVENTRICULAR, IN SITU: ICD-10-CM

## 2024-05-28 ENCOUNTER — HOSPITAL ENCOUNTER (OUTPATIENT)
Dept: CARDIOLOGY | Facility: HOSPITAL | Age: 69
Discharge: HOME | End: 2024-05-28
Payer: MEDICARE

## 2024-05-28 DIAGNOSIS — Z95.810 ICD (IMPLANTABLE CARDIOVERTER-DEFIBRILLATOR), BIVENTRICULAR, IN SITU: ICD-10-CM

## 2024-05-29 LAB
NON-UH HIE ANION GAP: 10.9 (ref 6–15)
NON-UH HIE ANISOCYTOSIS: SLIGHT
NON-UH HIE BASOPHILS # (AUTO): 0 10*3/UL (ref 0–0.2)
NON-UH HIE BASOPHILS % (AUTO): 0.9 %
NON-UH HIE BLOOD UREA NITROGEN: 20 MG/DL (ref 7–25)
NON-UH HIE CALCIUM: 9.4 MG/DL (ref 8.6–10.3)
NON-UH HIE CARBON DIOXIDE: 27.3 MMOL/L (ref 21–31)
NON-UH HIE CHLORIDE: 105 MMOL/L (ref 98–107)
NON-UH HIE CREATININE: 0.79 MG/DL (ref 0.6–1.2)
NON-UH HIE EOSINOPHILS # (AUTO): 0.5 10*3/UL (ref 0–0.45)
NON-UH HIE EOSINOPHILS % (AUTO): 9.5 %
NON-UH HIE ESTIMATED GFR: > 60
NON-UH HIE GLUCOSE: 92 MG/DL (ref 70–100)
NON-UH HIE HEMATOCRIT: 34.4 % (ref 34–46.4)
NON-UH HIE HEMOGLOBIN: 11.5 G/DL (ref 11.8–15.4)
NON-UH HIE INR: 0.9
NON-UH HIE LYMPHOCYTES # (AUTO): 1.2 10*3/UL (ref 1–4.8)
NON-UH HIE LYMPHOCYTES % (AUTO): 23.2 %
NON-UH HIE MEAN CORPUSCULAR HEMOGLOBIN: 29.9 PG (ref 24.7–34.3)
NON-UH HIE MEAN CORPUSCULAR HGB CONC: 33.6 G/DL (ref 32–35)
NON-UH HIE MEAN CORPUSCULAR VOLUME: 89 FL (ref 80–100)
NON-UH HIE MEAN PLATELET VOLUME: 9.5 FL (ref 6.3–10.7)
NON-UH HIE MICROCYTOSIS: SLIGHT
NON-UH HIE MONOCYTES # (AUTO): 0.5 10*3/UL (ref 0–0.8)
NON-UH HIE MONOCYTES % (AUTO): 10.1 %
NON-UH HIE NEUTROPHILS # (AUTO): 2.9 10*3/UL (ref 1.8–7.7)
NON-UH HIE NEUTROPHILS % (AUTO): 56.3 %
NON-UH HIE NRBC%: 0.2 /100{WBC} (ref 0–0.5)
NON-UH HIE PLATELET COUNT: 170 10*3/UL (ref 150–450)
NON-UH HIE PLATELET ESTIMATE: NORMAL
NON-UH HIE PLATELET MORPHOLOGY: NORMAL
NON-UH HIE POTASSIUM: 4.2 MMOL/L (ref 3.5–5.1)
NON-UH HIE PROTHROMBIN TIME: 10.4 S (ref 9–12.9)
NON-UH HIE RBC MORPHOLOGY: NORMAL
NON-UH HIE RED BLOOD COUNT: 3.86 (ref 3.6–5)
NON-UH HIE RED CELL DISTRIBUTION WIDTH: 13.8 % (ref 11.9–15.3)
NON-UH HIE SODIUM: 139 MMOL/L (ref 136–145)
NON-UH HIE UNCORRECTED WBC: 5.5 10*3/UL (ref 3.8–11.6)
NON-UH HIE WHITE BLOOD COUNT: 5.2 10*3/UL (ref 3.8–11.6)

## 2024-06-05 ENCOUNTER — ANESTHESIA EVENT (OUTPATIENT)
Dept: CARDIOLOGY | Facility: HOSPITAL | Age: 69
End: 2024-06-05
Payer: MEDICARE

## 2024-06-05 ENCOUNTER — APPOINTMENT (OUTPATIENT)
Dept: CARDIOLOGY | Facility: HOSPITAL | Age: 69
End: 2024-06-05
Payer: MEDICARE

## 2024-06-05 ENCOUNTER — HOSPITAL ENCOUNTER (OUTPATIENT)
Facility: HOSPITAL | Age: 69
Setting detail: OUTPATIENT SURGERY
Discharge: HOME | End: 2024-06-05
Attending: INTERNAL MEDICINE | Admitting: INTERNAL MEDICINE
Payer: MEDICARE

## 2024-06-05 ENCOUNTER — ANESTHESIA (OUTPATIENT)
Dept: CARDIOLOGY | Facility: HOSPITAL | Age: 69
End: 2024-06-05
Payer: MEDICARE

## 2024-06-05 VITALS
BODY MASS INDEX: 36.25 KG/M2 | DIASTOLIC BLOOD PRESSURE: 58 MMHG | WEIGHT: 192.02 LBS | TEMPERATURE: 97.5 F | HEART RATE: 66 BPM | RESPIRATION RATE: 18 BRPM | OXYGEN SATURATION: 99 % | SYSTOLIC BLOOD PRESSURE: 131 MMHG | HEIGHT: 61 IN

## 2024-06-05 DIAGNOSIS — Z95.810 ELECTIVE REPLACEMENT OF IMPLANTABLE CARDIOVERTER-DEFIBRILLATOR (ICD) BATTERY REQUIRED: Primary | ICD-10-CM

## 2024-06-05 PROCEDURE — 93005 ELECTROCARDIOGRAM TRACING: CPT

## 2024-06-05 PROCEDURE — 93641 EP EVL 1/2CHMB PAC CVDFB TST: CPT | Performed by: INTERNAL MEDICINE

## 2024-06-05 PROCEDURE — 99152 MOD SED SAME PHYS/QHP 5/>YRS: CPT | Performed by: INTERNAL MEDICINE

## 2024-06-05 PROCEDURE — 93010 ELECTROCARDIOGRAM REPORT: CPT | Performed by: INTERNAL MEDICINE

## 2024-06-05 PROCEDURE — C1781 MESH (IMPLANTABLE): HCPCS | Performed by: INTERNAL MEDICINE

## 2024-06-05 PROCEDURE — 93005 ELECTROCARDIOGRAM TRACING: CPT | Mod: 59

## 2024-06-05 PROCEDURE — 2720000007 HC OR 272 NO HCPCS: Performed by: INTERNAL MEDICINE

## 2024-06-05 PROCEDURE — 3700000002 HC GENERAL ANESTHESIA TIME - EACH INCREMENTAL 1 MINUTE: Performed by: INTERNAL MEDICINE

## 2024-06-05 PROCEDURE — 33264 RMVL & RPLCMT DFB GEN MLT LD: CPT | Performed by: INTERNAL MEDICINE

## 2024-06-05 PROCEDURE — 7100000010 HC PHASE TWO TIME - EACH INCREMENTAL 1 MINUTE: Performed by: INTERNAL MEDICINE

## 2024-06-05 PROCEDURE — 93287 PERI-PX DEVICE EVAL & PRGR: CPT | Performed by: INTERNAL MEDICINE

## 2024-06-05 PROCEDURE — 99153 MOD SED SAME PHYS/QHP EA: CPT | Performed by: INTERNAL MEDICINE

## 2024-06-05 PROCEDURE — 2500000004 HC RX 250 GENERAL PHARMACY W/ HCPCS (ALT 636 FOR OP/ED): Performed by: INTERNAL MEDICINE

## 2024-06-05 PROCEDURE — 2500000004 HC RX 250 GENERAL PHARMACY W/ HCPCS (ALT 636 FOR OP/ED): Performed by: ANESTHESIOLOGY

## 2024-06-05 PROCEDURE — 2780000003 HC OR 278 NO HCPCS: Performed by: INTERNAL MEDICINE

## 2024-06-05 PROCEDURE — C1882 AICD, OTHER THAN SING/DUAL: HCPCS | Performed by: INTERNAL MEDICINE

## 2024-06-05 PROCEDURE — 99223 1ST HOSP IP/OBS HIGH 75: CPT | Performed by: INTERNAL MEDICINE

## 2024-06-05 PROCEDURE — 93287 PERI-PX DEVICE EVAL & PRGR: CPT | Mod: 59 | Performed by: INTERNAL MEDICINE

## 2024-06-05 PROCEDURE — 2750000001 HC OR 275 NO HCPCS: Performed by: INTERNAL MEDICINE

## 2024-06-05 PROCEDURE — 3700000001 HC GENERAL ANESTHESIA TIME - INITIAL BASE CHARGE: Performed by: INTERNAL MEDICINE

## 2024-06-05 PROCEDURE — 2500000005 HC RX 250 GENERAL PHARMACY W/O HCPCS: Performed by: INTERNAL MEDICINE

## 2024-06-05 PROCEDURE — 2500000001 HC RX 250 WO HCPCS SELF ADMINISTERED DRUGS (ALT 637 FOR MEDICARE OP): Performed by: NURSE PRACTITIONER

## 2024-06-05 PROCEDURE — 7100000009 HC PHASE TWO TIME - INITIAL BASE CHARGE: Performed by: INTERNAL MEDICINE

## 2024-06-05 PROCEDURE — 33223 RELOCATE POCKET FOR DEFIB: CPT | Mod: 59 | Performed by: INTERNAL MEDICINE

## 2024-06-05 PROCEDURE — 2500000004 HC RX 250 GENERAL PHARMACY W/ HCPCS (ALT 636 FOR OP/ED): Performed by: NURSE PRACTITIONER

## 2024-06-05 DEVICE — CRTD DTMA1QQ CLARIA MRI QUAD CRTD US
Type: IMPLANTABLE DEVICE | Site: CHEST | Status: FUNCTIONAL
Brand: CLARIA MRI™ QUAD CRT-D SURESCAN™

## 2024-06-05 RX ORDER — VANCOMYCIN HYDROCHLORIDE 1 G/200ML
1000 INJECTION, SOLUTION INTRAVENOUS ONCE
Status: DISCONTINUED | OUTPATIENT
Start: 2024-06-05 | End: 2024-06-05

## 2024-06-05 RX ORDER — ONDANSETRON HYDROCHLORIDE 2 MG/ML
4 INJECTION, SOLUTION INTRAVENOUS EVERY 8 HOURS PRN
Status: DISCONTINUED | OUTPATIENT
Start: 2024-06-05 | End: 2024-06-05 | Stop reason: HOSPADM

## 2024-06-05 RX ORDER — FENTANYL CITRATE 50 UG/ML
INJECTION, SOLUTION INTRAMUSCULAR; INTRAVENOUS AS NEEDED
Status: DISCONTINUED | OUTPATIENT
Start: 2024-06-05 | End: 2024-06-05 | Stop reason: HOSPADM

## 2024-06-05 RX ORDER — CEFAZOLIN SODIUM 1 G/50ML
SOLUTION INTRAVENOUS CONTINUOUS PRN
Status: COMPLETED | OUTPATIENT
Start: 2024-06-05 | End: 2024-06-05

## 2024-06-05 RX ORDER — MUPIROCIN 20 MG/G
1 OINTMENT TOPICAL ONCE
Status: COMPLETED | OUTPATIENT
Start: 2024-06-05 | End: 2024-06-05

## 2024-06-05 RX ORDER — ACETAMINOPHEN 325 MG/1
650 TABLET ORAL EVERY 4 HOURS PRN
Status: DISCONTINUED | OUTPATIENT
Start: 2024-06-05 | End: 2024-06-05 | Stop reason: HOSPADM

## 2024-06-05 RX ORDER — SODIUM CHLORIDE 9 MG/ML
10 INJECTION, SOLUTION INTRAVENOUS CONTINUOUS
Status: DISCONTINUED | OUTPATIENT
Start: 2024-06-05 | End: 2024-06-05 | Stop reason: HOSPADM

## 2024-06-05 RX ORDER — CHLORHEXIDINE GLUCONATE 40 MG/ML
SOLUTION TOPICAL ONCE
Status: COMPLETED | OUTPATIENT
Start: 2024-06-05 | End: 2024-06-05

## 2024-06-05 RX ORDER — MIDAZOLAM HYDROCHLORIDE 1 MG/ML
INJECTION, SOLUTION INTRAMUSCULAR; INTRAVENOUS AS NEEDED
Status: DISCONTINUED | OUTPATIENT
Start: 2024-06-05 | End: 2024-06-05 | Stop reason: HOSPADM

## 2024-06-05 RX ORDER — BUPIVACAINE HYDROCHLORIDE 2.5 MG/ML
INJECTION, SOLUTION EPIDURAL; INFILTRATION; INTRACAUDAL AS NEEDED
Status: DISCONTINUED | OUTPATIENT
Start: 2024-06-05 | End: 2024-06-05 | Stop reason: HOSPADM

## 2024-06-05 RX ORDER — CEFAZOLIN SODIUM 1 G/50ML
1 SOLUTION INTRAVENOUS EVERY 8 HOURS
Status: DISCONTINUED | OUTPATIENT
Start: 2024-06-05 | End: 2024-06-05 | Stop reason: HOSPADM

## 2024-06-05 RX ORDER — LIDOCAINE HYDROCHLORIDE 10 MG/ML
INJECTION, SOLUTION EPIDURAL; INFILTRATION; INTRACAUDAL; PERINEURAL AS NEEDED
Status: DISCONTINUED | OUTPATIENT
Start: 2024-06-05 | End: 2024-06-05 | Stop reason: HOSPADM

## 2024-06-05 RX ORDER — PROPOFOL 10 MG/ML
INJECTION, EMULSION INTRAVENOUS AS NEEDED
Status: DISCONTINUED | OUTPATIENT
Start: 2024-06-05 | End: 2024-06-05

## 2024-06-05 RX ADMIN — PROPOFOL 40 MG: 10 INJECTION, EMULSION INTRAVENOUS at 10:06

## 2024-06-05 RX ADMIN — Medication 1 APPLICATION: at 08:59

## 2024-06-05 RX ADMIN — MUPIROCIN 1 APPLICATION: 20 OINTMENT TOPICAL at 09:03

## 2024-06-05 RX ADMIN — SODIUM CHLORIDE 10 ML/HR: 9 INJECTION, SOLUTION INTRAVENOUS at 08:58

## 2024-06-05 SDOH — HEALTH STABILITY: MENTAL HEALTH: CURRENT SMOKER: 0

## 2024-06-05 ASSESSMENT — COLUMBIA-SUICIDE SEVERITY RATING SCALE - C-SSRS
2. HAVE YOU ACTUALLY HAD ANY THOUGHTS OF KILLING YOURSELF?: NO
1. IN THE PAST MONTH, HAVE YOU WISHED YOU WERE DEAD OR WISHED YOU COULD GO TO SLEEP AND NOT WAKE UP?: NO
6. HAVE YOU EVER DONE ANYTHING, STARTED TO DO ANYTHING, OR PREPARED TO DO ANYTHING TO END YOUR LIFE?: NO

## 2024-06-05 ASSESSMENT — ENCOUNTER SYMPTOMS
SHORTNESS OF BREATH: 0
CHILLS: 0
FEVER: 0
PALPITATIONS: 0

## 2024-06-05 NOTE — Clinical Note
The DEFIBRILLATOR, CRT-D, CLARIA MRI QUAD US DF4 - [id number] device was inserted. The leads were placed into the connector and visually verified to be in correct position.

## 2024-06-05 NOTE — ANESTHESIA PREPROCEDURE EVALUATION
"Darcy Kwon is a 68 y.o. female here for:    ICD BIV Generator Change Out  With Yareli Ochoa MD  Pre-Op Diagnosis Codes:     * Elective replacement of implantable cardioverter-defibrillator (ICD) battery required [Z95.810]    Relevant Problems   Cardiac   (+) CHF NYHA class III (Multi)   (+) LBBB (left bundle branch block)      Endocrine   (+) Obesity      ID   (+) Disease due to severe acute respiratory syndrome coronavirus 2 (SARS-CoV-2)      Cardiac and Vasculature   (+) Elective replacement of implantable cardioverter-defibrillator (ICD) battery required       No results found for: \"HGB\", \"HCT\", \"WBC\", \"PLT\", \"GLU\", \"NA\", \"K\", \"CL\", \"CREATININE\", \"BUN\"    Social History     Tobacco Use   Smoking Status Former    Current packs/day: 0.00    Average packs/day: 0.3 packs/day for 6.0 years (1.5 ttl pk-yrs)    Types: Cigarettes    Start date: 1971    Quit date: 1977    Years since quittin.0   Smokeless Tobacco Never       Allergies   Allergen Reactions    Atorvastatin Myalgia       Current Outpatient Medications   Medication Instructions    albuterol 90 mcg/actuation inhaler inhalation, Every 4 hours RT    Altace 10 mg capsule 1 capsule, oral, Daily    CALCIUM CARBONATE-VITAMIN D3 ORAL 1 tablet, oral, 2 times daily    carvedilol (Coreg) 12.5 mg tablet 1 tablet, oral, 2 times daily    cholecalciferol (Vitamin D-3) 25 MCG (1000 UT) tablet 1 tablet, oral, Daily    furosemide (Lasix) 20 mg tablet oral    magnesium oxide (MAG-OX) 400 mg, oral, Daily    mometasone (Nasonex) 50 mcg/actuation nasal spray nasal    multivitamin (Daily Multi-Vitamin) tablet 1 tablet, oral, Daily    omeprazole (PriLOSEC) 40 mg DR capsule oral    rosuvastatin (Crestor) 10 mg tablet 1 tablet, oral, Daily    spironolactone (Aldactone) 25 mg tablet 1 tablet, oral, Daily    tamoxifen (NOLVADEX) 20 mg, oral, Daily, Take with water or any other nonalcoholic drink with or without food at around the same time(s) every day.       Past " Surgical History:   Procedure Laterality Date    BI STEREOTACTIC GUIDED BREAST LEFT LOCALIZATION AND BIOPSY Left 12/14/2023    BI STEREOTACTIC GUIDED BREAST LEFT LOCALIZATION AND BIOPSY 12/14/2023 Caroline R[facility]con [facility]    CARDIAC CATHETERIZATION  2003    INSERT / REPLACE / REMOVE PACEMAKER  ICD 2016    OTHER SURGICAL HISTORY  11/23/2021    Cataract surgery    OTHER SURGICAL HISTORY  11/23/2021    Blepharoplasty    OTHER SURGICAL HISTORY  11/23/2021    Complete colonoscopy    OTHER SURGICAL HISTORY  11/23/2021    Cardioverter defibrillator insertion    OTHER SURGICAL HISTORY  11/23/2021    Surgery    OTHER SURGICAL HISTORY  11/23/2021    Tonsillectomy    VEIN SURGERY  1982       Family History   Problem Relation Name Age of Onset    Breast cancer Mother      Ovarian cancer Mother      Breast cancer Mother's Sister      Cancer Father Dheerajsteve Bennetters     Heart disease Father Dheeraj Santana        NPO Details:  NPO/Void Status  Carbohydrate Drink Given Prior to Surgery? : N  Date of Last Liquid: 06/04/24  Time of Last Liquid: 0707  Date of Last Solid: 06/04/24  Time of Last Solid: 2100  Last Intake Type: Clear fluids  Time of Last Void: 0700        Physical Exam    Airway  Mallampati: III  TM distance: >3 FB     Cardiovascular    Dental    Pulmonary    Abdominal            Anesthesia Plan    History of general anesthesia?: yes  History of complications of general anesthesia?: no    ASA 3     MAC     The patient is not a current smoker.    intravenous induction   Anesthetic plan and risks discussed with patient.    Plan discussed with CRNA.

## 2024-06-05 NOTE — ANESTHESIA POSTPROCEDURE EVALUATION
Patient: Darcy Kwon    Procedure Summary       Date: 06/05/24 Room / Location: [facility] / [facility]    Anesthesia Start: 1004 Anesthesia Stop: 1012    Procedure: ICD BIV Generator Change Out (Left: Chest) Diagnosis:       Elective replacement of implantable cardioverter-defibrillator (ICD) battery required      (Elective replacement of implantable cardioverter-defibrillator (ICD) battery required [Z95.810])    Providers: Yareli Ochoa MD Responsible Provider: Kyle Broussard MD    Anesthesia Type: MAC ASA Status: 3            Anesthesia Type: MAC    Vitals Value Taken Time   BP 83/44 06/05/24 1035   Temp 36 06/05/24 1035   Pulse 62 06/05/24 1035   Resp 14 06/05/24 1035   SpO2 98% 06/05/24 1035       Anesthesia Post Evaluation    Patient participation: complete - patient participated  Level of consciousness: sleepy but conscious  Pain management: adequate  Airway patency: patent  Cardiovascular status: acceptable, blood pressure returned to baseline and hemodynamically stable  Respiratory status: acceptable, nonlabored ventilation, spontaneous ventilation, nasal cannula and unassisted  Hydration status: acceptable  Postoperative Nausea and Vomiting: none      There were no known notable events for this encounter.

## 2024-06-05 NOTE — NURSING NOTE
Patient states understanding of discharge instructions as given.  Medications and follow up appointments reviewed with patient and spouse. Left pectoral site remains stable. No complaint  of pain.

## 2024-06-05 NOTE — Clinical Note
Generator to be explanted:  AASHISH [id number]  [facility]  MRI QUADCRT-D   [id number]  09/28/16

## 2024-06-05 NOTE — H&P
History Of Present Illness  Darcy Kwon is a 68 y.o. female presenting with device at replacement.  She has no arrhythmia symptoms.  She denies any lightheadedness, near syncope, or syncope.       Past Medical History  Past Medical History:   Diagnosis Date    Asthma ([facility])     CHF (congestive heart failure) (Multi)        Surgical History  Past Surgical History:   Procedure Laterality Date    BI STEREOTACTIC GUIDED BREAST LEFT LOCALIZATION AND BIOPSY Left 12/14/2023    BI STEREOTACTIC GUIDED BREAST LEFT LOCALIZATION AND BIOPSY 12/14/2023 Caroline R[facility]con MD [facility]    CARDIAC CATHETERIZATION  2003    INSERT / REPLACE / REMOVE PACEMAKER  ICD 2016    OTHER SURGICAL HISTORY  11/23/2021    Cataract surgery    OTHER SURGICAL HISTORY  11/23/2021    Blepharoplasty    OTHER SURGICAL HISTORY  11/23/2021    Complete colonoscopy    OTHER SURGICAL HISTORY  11/23/2021    Cardioverter defibrillator insertion    OTHER SURGICAL HISTORY  11/23/2021    Surgery    OTHER SURGICAL HISTORY  11/23/2021    Tonsillectomy    VEIN SURGERY  1982        Social History  She reports that she quit smoking about 47 years ago. Her smoking use included cigarettes. She started smoking about 53 years ago. She has a 1.5 pack-year smoking history. She has never used smokeless tobacco. She reports current alcohol use. She reports that she does not use drugs.    Family History  Family History   Problem Relation Name Age of Onset    Breast cancer Mother      Ovarian cancer Mother      Breast cancer Mother's Sister      Cancer Father Dheerajsteve Santana     Heart disease Father Dheeraj Santana         Allergies  Atorvastatin    Review of Systems   Constitutional:  Negative for chills and fever.   Respiratory:  Negative for shortness of breath.    Cardiovascular:  Negative for chest pain, palpitations and leg swelling.   All other systems reviewed and are negative.       Physical Exam  Constitutional:       Appearance: Normal appearance.   HENT:      Head:  "Normocephalic.      Nose: Nose normal.      Mouth/Throat:      Mouth: Mucous membranes are dry.   Eyes:      Extraocular Movements: Extraocular movements intact.   Cardiovascular:      Rate and Rhythm: Normal rate and regular rhythm.      Heart sounds: Normal heart sounds.   Pulmonary:      Breath sounds: Normal breath sounds.   Musculoskeletal:         General: Normal range of motion.      Cervical back: Neck supple.   Skin:     General: Skin is dry.   Neurological:      Mental Status: She is alert.          Last Recorded Vitals  Blood pressure 131/58, pulse 66, temperature 36.4 °C (97.5 °F), temperature source Temporal, resp. rate 18, height 1.549 m (5' 1\"), weight 87.1 kg (192 lb 0.3 oz), SpO2 99%.    Relevant Results  No results found for: \"WBC\", \"HGB\", \"HCT\", \"MCV\", \"PLT\"   No results found for: \"GLUCOSE\", \"CALCIUM\", \"NA\", \"K\", \"CO2\", \"CL\", \"BUN\", \"CREATININE\"   No results found for: \"INR\", \"PROTIME\"          Assessment/Plan   Principal Problem:    Elective replacement of implantable cardioverter-defibrillator (ICD) battery required  Nonischemic cardiomyopathy, LVEF improved to 54%  Preop cardiac evaluation performed. Shared decision making. [place] decision tool. All questions answered. Econsent obtained.  Counseling over 50% visit performed. The patient and I reviewed device function, MARY, preop cardiac evaluation, medications, treatment options, risks, benefits, and imponderables.       I spent 80 minutes in the professional and overall care of this patient.      Yareli Ochoa MD    "

## 2024-06-06 LAB
ATRIAL RATE: 60 BPM
ATRIAL RATE: 64 BPM
P AXIS: 67 DEGREES
P OFFSET: 174 MS
P OFFSET: 176 MS
P ONSET: 131 MS
P ONSET: 133 MS
PR INTERVAL: 134 MS
PR INTERVAL: 138 MS
Q ONSET: 183 MS
Q ONSET: 200 MS
QRS COUNT: 10 BEATS
QRS COUNT: 10 BEATS
QRS DURATION: 142 MS
QRS DURATION: 164 MS
QT INTERVAL: 500 MS
QT INTERVAL: 512 MS
QTC CALCULATION(BAZETT): 500 MS
QTC CALCULATION(BAZETT): 528 MS
QTC FREDERICIA: 500 MS
QTC FREDERICIA: 523 MS
R AXIS: 101 DEGREES
R AXIS: 66 DEGREES
T AXIS: -42 DEGREES
T AXIS: 0 DEGREES
T OFFSET: 433 MS
T OFFSET: 456 MS
VENTRICULAR RATE: 60 BPM
VENTRICULAR RATE: 64 BPM

## 2024-06-10 ENCOUNTER — OFFICE VISIT (OUTPATIENT)
Dept: CARDIOLOGY | Facility: CLINIC | Age: 69
End: 2024-06-10
Payer: MEDICARE

## 2024-06-10 VITALS
HEIGHT: 61 IN | WEIGHT: 191 LBS | BODY MASS INDEX: 36.06 KG/M2 | DIASTOLIC BLOOD PRESSURE: 70 MMHG | SYSTOLIC BLOOD PRESSURE: 117 MMHG | HEART RATE: 64 BPM

## 2024-06-10 DIAGNOSIS — Z95.810 ICD (IMPLANTABLE CARDIOVERTER-DEFIBRILLATOR), BIVENTRICULAR, IN SITU: ICD-10-CM

## 2024-06-10 DIAGNOSIS — E78.5 DYSLIPIDEMIA (HIGH LDL; LOW HDL): ICD-10-CM

## 2024-06-10 DIAGNOSIS — Z00.00 HEALTHCARE MAINTENANCE: ICD-10-CM

## 2024-06-10 DIAGNOSIS — I44.7 LBBB (LEFT BUNDLE BRANCH BLOCK): ICD-10-CM

## 2024-06-10 DIAGNOSIS — I50.20: ICD-10-CM

## 2024-06-10 DIAGNOSIS — I42.0 DILATED CARDIOMYOPATHY (MULTI): Primary | ICD-10-CM

## 2024-06-10 DIAGNOSIS — Z87.891 FORMER SMOKER: ICD-10-CM

## 2024-06-10 PROBLEM — T82.198A MECHANICAL COMPLICATION OF IMPLANTED CARDIAC DEFIBRILLATOR: Status: RESOLVED | Noted: 2023-11-16 | Resolved: 2024-06-10

## 2024-06-10 PROCEDURE — 1036F TOBACCO NON-USER: CPT | Performed by: INTERNAL MEDICINE

## 2024-06-10 PROCEDURE — 1159F MED LIST DOCD IN RCRD: CPT | Performed by: INTERNAL MEDICINE

## 2024-06-10 PROCEDURE — 99024 POSTOP FOLLOW-UP VISIT: CPT | Performed by: INTERNAL MEDICINE

## 2024-06-10 PROCEDURE — 3008F BODY MASS INDEX DOCD: CPT | Performed by: INTERNAL MEDICINE

## 2024-06-10 RX ORDER — CARVEDILOL 12.5 MG/1
12.5 TABLET ORAL 2 TIMES DAILY
Qty: 180 TABLET | Refills: 3 | Status: SHIPPED | OUTPATIENT
Start: 2024-06-10 | End: 2025-06-10

## 2024-06-10 RX ORDER — FUROSEMIDE 20 MG/1
20 TABLET ORAL DAILY
Qty: 90 TABLET | Refills: 3 | Status: SHIPPED | OUTPATIENT
Start: 2024-06-10 | End: 2025-06-10

## 2024-06-10 RX ORDER — LANOLIN ALCOHOL/MO/W.PET/CERES
1 CREAM (GRAM) TOPICAL DAILY
Qty: 90 TABLET | Refills: 3 | Status: SHIPPED | OUTPATIENT
Start: 2024-06-10 | End: 2025-06-10

## 2024-06-10 RX ORDER — SPIRONOLACTONE 25 MG/1
25 TABLET ORAL DAILY
Qty: 90 TABLET | Refills: 3 | Status: SHIPPED | OUTPATIENT
Start: 2024-06-10 | End: 2025-06-10

## 2024-06-10 RX ORDER — ROSUVASTATIN CALCIUM 10 MG/1
10 TABLET, COATED ORAL DAILY
Qty: 90 TABLET | Refills: 3 | Status: SHIPPED | OUTPATIENT
Start: 2024-06-10 | End: 2025-06-10

## 2024-06-10 NOTE — LETTER
"Nan 10, 2024     Amilcar Lopez DO  [address]  [facility] [address]    Patient: Darcy Kwon   YOB: 1955   Date of Visit: 6/10/2024       Dear Dr. Amilcar Lopez DO:    Thank you for referring Darcy Kwon to me for evaluation. Below are my notes for this consultation.  If you have questions, please do not hesitate to call me. I look forward to following your patient along with you.       Sincerely,     Jose Rondon MD      CC: No Recipients  ______________________________________________________________________________________    Subjective   Darcy Kwon is a 68 y.o. female       Chief Complaint    Follow-up          HPI     Review of Systems   All other systems reviewed and are negative.     Patient returns in follow-up of problems as noted.  She is done well.  She denies any orthopnea PND or dyspnea on exertion and has had no heart failure symptoms.  She remembers how bad she used to feel and she appears to be functional class I now.  Chart will be changed.  In regards to other matters her blood pressure and lipids are adequately addressed and controlled.  She recently underwent ICD generator change and is doing well in this regard and we observed that there was a significant improvement in functional status with biventricular pacing hence we endorsed this technology.    Increased BMI is noted in the merits of diet and weight loss were advocated.      Vitals:    06/10/24 1030   BP: 117/70   BP Location: Left arm   Patient Position: Sitting   Pulse: 64   Weight: 86.6 kg (191 lb)   Height: 1.549 m (5' 1\")        Objective   Physical Exam  Constitutional:       Appearance: Normal appearance.   HENT:      Nose: Nose normal.   Neck:      Vascular: No carotid bruit.   Cardiovascular:      Rate and Rhythm: Normal rate.      Pulses: Normal pulses.      Heart sounds: Normal heart sounds.   Pulmonary:      Effort: Pulmonary effort is normal.   Abdominal:      General: Bowel " sounds are normal.      Palpations: Abdomen is soft.   Musculoskeletal:         General: Normal range of motion.      Cervical back: Normal range of motion.      Right lower leg: No edema.      Left lower leg: No edema.   Skin:     General: Skin is warm and dry.   Neurological:      General: No focal deficit present.      Mental Status: She is alert.   Psychiatric:         Mood and Affect: Mood normal.         Behavior: Behavior normal.         Thought Content: Thought content normal.         Judgment: Judgment normal.         Allergies  Atorvastatin     Current Medications    Current Outpatient Medications:   •  albuterol 90 mcg/actuation inhaler, Inhale every 4 hours., Disp: , Rfl:   •  Altace 10 mg capsule, Take 1 capsule (10 mg) by mouth once daily., Disp: , Rfl:   •  CALCIUM CARBONATE-VITAMIN D3 ORAL, Take 1 tablet by mouth 2 times a day., Disp: , Rfl:   •  carvedilol (Coreg) 12.5 mg tablet, Take 1 tablet (12.5 mg) by mouth 2 times a day., Disp: , Rfl:   •  cholecalciferol (Vitamin D-3) 25 MCG (1000 UT) tablet, Take 1 tablet (25 mcg) by mouth once daily., Disp: , Rfl:   •  furosemide (Lasix) 20 mg tablet, Take by mouth., Disp: , Rfl:   •  magnesium oxide (Mag-Ox) 400 mg (241.3 mg magnesium) tablet, Take 1 tablet by mouth once daily, Disp: 90 tablet, Rfl: 3  •  mometasone (Nasonex) 50 mcg/actuation nasal spray, Administer into affected nostril(s)., Disp: , Rfl:   •  multivitamin (Daily Multi-Vitamin) tablet, Take 1 tablet by mouth once daily., Disp: , Rfl:   •  omeprazole (PriLOSEC) 40 mg DR capsule, Take by mouth., Disp: , Rfl:   •  rosuvastatin (Crestor) 10 mg tablet, Take 1 tablet (10 mg) by mouth once daily., Disp: , Rfl:   •  spironolactone (Aldactone) 25 mg tablet, Take 1 tablet (25 mg) by mouth once daily., Disp: , Rfl:   •  tamoxifen (Nolvadex) 20 mg tablet, Take 1 tablet (20 mg total) by mouth once daily.  Take with water or any other nonalcoholic drink with or without food at around the same time(s)  every day., Disp: , Rfl:                      Assessment/Plan   1. Dilated cardiomyopathy (Multi)  Significant improvement with guideline directed therapy and biventricular pacing    2. Systolic congestive heart failure, NYHA class 3, unspecified congestive heart failure chronicity (Multi)  Actually functional class I.  Will adjust chart    3. Dyslipidemia (high LDL; low HDL)  Review of treatment strategy demonstrates good control    4. ICD (implantable cardioverter-defibrillator), biventricular, in situ  Recent generator change was performed uneventfully.  This technology has improved ejection fraction    5. LBBB (left bundle branch block)  This qualify her for BiV pacing and it has been beneficial    6. BMI 36.0-36.9,adult  The merits of weight loss were advocated    7. Former smoker  Congratulated on cessation accomplishment        Scribe Attestation  By signing my name below, I, Noé Munguia LPN   attest that this documentation has been prepared under the direction and in the presence of Jose Rondon MD.     Provider Attestation - Scribe documentation    All medical record entries made by the Scribe were at my direction and personally dictated by me. I have reviewed the chart and agree that the record accurately reflects my personal performance of the history, physical exam, discussion and plan.

## 2024-06-10 NOTE — PROGRESS NOTES
"Subjective   Darcy Kwon is a 68 y.o. female       Chief Complaint    Follow-up          HPI     Review of Systems   All other systems reviewed and are negative.     Patient returns in follow-up of problems as noted.  She is done well.  She denies any orthopnea PND or dyspnea on exertion and has had no heart failure symptoms.  She remembers how bad she used to feel and she appears to be functional class I now.  Chart will be changed.  In regards to other matters her blood pressure and lipids are adequately addressed and controlled.  She recently underwent ICD generator change and is doing well in this regard and we observed that there was a significant improvement in functional status with biventricular pacing hence we endorsed this technology.    Increased BMI is noted in the merits of diet and weight loss were advocated.      Vitals:    06/10/24 1030   BP: 117/70   BP Location: Left arm   Patient Position: Sitting   Pulse: 64   Weight: 86.6 kg (191 lb)   Height: 1.549 m (5' 1\")        Objective   Physical Exam  Constitutional:       Appearance: Normal appearance.   HENT:      Nose: Nose normal.   Neck:      Vascular: No carotid bruit.   Cardiovascular:      Rate and Rhythm: Normal rate.      Pulses: Normal pulses.      Heart sounds: Normal heart sounds.   Pulmonary:      Effort: Pulmonary effort is normal.   Abdominal:      General: Bowel sounds are normal.      Palpations: Abdomen is soft.   Musculoskeletal:         General: Normal range of motion.      Cervical back: Normal range of motion.      Right lower leg: No edema.      Left lower leg: No edema.   Skin:     General: Skin is warm and dry.   Neurological:      General: No focal deficit present.      Mental Status: She is alert.   Psychiatric:         Mood and Affect: Mood normal.         Behavior: Behavior normal.         Thought Content: Thought content normal.         Judgment: Judgment normal.         Allergies  Atorvastatin     Current " Medications    Current Outpatient Medications:     albuterol 90 mcg/actuation inhaler, Inhale every 4 hours., Disp: , Rfl:     Altace 10 mg capsule, Take 1 capsule (10 mg) by mouth once daily., Disp: , Rfl:     CALCIUM CARBONATE-VITAMIN D3 ORAL, Take 1 tablet by mouth 2 times a day., Disp: , Rfl:     carvedilol (Coreg) 12.5 mg tablet, Take 1 tablet (12.5 mg) by mouth 2 times a day., Disp: , Rfl:     cholecalciferol (Vitamin D-3) 25 MCG (1000 UT) tablet, Take 1 tablet (25 mcg) by mouth once daily., Disp: , Rfl:     furosemide (Lasix) 20 mg tablet, Take by mouth., Disp: , Rfl:     magnesium oxide (Mag-Ox) 400 mg (241.3 mg magnesium) tablet, Take 1 tablet by mouth once daily, Disp: 90 tablet, Rfl: 3    mometasone (Nasonex) 50 mcg/actuation nasal spray, Administer into affected nostril(s)., Disp: , Rfl:     multivitamin (Daily Multi-Vitamin) tablet, Take 1 tablet by mouth once daily., Disp: , Rfl:     omeprazole (PriLOSEC) 40 mg DR capsule, Take by mouth., Disp: , Rfl:     rosuvastatin (Crestor) 10 mg tablet, Take 1 tablet (10 mg) by mouth once daily., Disp: , Rfl:     spironolactone (Aldactone) 25 mg tablet, Take 1 tablet (25 mg) by mouth once daily., Disp: , Rfl:     tamoxifen (Nolvadex) 20 mg tablet, Take 1 tablet (20 mg total) by mouth once daily.  Take with water or any other nonalcoholic drink with or without food at around the same time(s) every day., Disp: , Rfl:                      Assessment/Plan   1. Dilated cardiomyopathy (Multi)  Significant improvement with guideline directed therapy and biventricular pacing    2. Systolic congestive heart failure, NYHA class 3, unspecified congestive heart failure chronicity (Multi)  Actually functional class I.  Will adjust chart    3. Dyslipidemia (high LDL; low HDL)  Review of treatment strategy demonstrates good control    4. ICD (implantable cardioverter-defibrillator), biventricular, in situ  Recent generator change was performed uneventfully.  This technology has  improved ejection fraction    5. LBBB (left bundle branch block)  This qualify her for BiV pacing and it has been beneficial    6. BMI 36.0-36.9,adult  The merits of weight loss were advocated    7. Former smoker  Congratulated on cessation accomplishment        Scribe Attestation  By signing my name below, I, Noé Munguia LPN   attest that this documentation has been prepared under the direction and in the presence of Jose Rondon MD.     Provider Attestation - Scribe documentation    All medical record entries made by the Scribe were at my direction and personally dictated by me. I have reviewed the chart and agree that the record accurately reflects my personal performance of the history, physical exam, discussion and plan.

## 2024-06-10 NOTE — PATIENT INSTRUCTIONS
Please bring all medicines, vitamins, and herbal supplements with you when you come to the office.    Prescriptions will not be filled unless you are compliant with your follow up appointments or have a follow up appointment scheduled as per instruction of your physician. Refills should be requested at the time of your visit.     Follow up with [facility] as scheduled.  
 used

## 2024-06-12 ENCOUNTER — APPOINTMENT (OUTPATIENT)
Dept: CARDIOLOGY | Facility: CLINIC | Age: 69
End: 2024-06-12
Payer: MEDICARE

## 2024-06-12 DIAGNOSIS — I42.0 DILATED CARDIOMYOPATHY (MULTI): ICD-10-CM

## 2024-06-12 DIAGNOSIS — I44.7 LBBB (LEFT BUNDLE BRANCH BLOCK): ICD-10-CM

## 2024-06-12 DIAGNOSIS — Z95.810 ICD (IMPLANTABLE CARDIOVERTER-DEFIBRILLATOR), BIVENTRICULAR, IN SITU: ICD-10-CM

## 2024-06-12 NOTE — PROGRESS NOTES
Pt. here for wound check of ICD replacement by Dr. Ochoa on 6/5/2024 at [facility]. L clavicular area is well approximated with no erythema or drainage. Pt. denies any fever or chills. Temp 98.2

## 2024-06-28 DIAGNOSIS — Z12.39 BREAST CANCER SCREENING, HIGH RISK PATIENT: Primary | ICD-10-CM

## 2024-06-28 RX ORDER — TAMOXIFEN CITRATE 20 MG/1
20 TABLET ORAL DAILY
Qty: 90 TABLET | Refills: 3 | Status: SHIPPED | OUTPATIENT
Start: 2024-06-28

## 2024-07-17 NOTE — PROGRESS NOTES
Darcy Kwon female   1955 68 y.o.   [id number]      Chief Complaint  Annual mammogram and exam, BRCA 2 positive, high risk surveillance care    History Of Present Illness  Darcy Kwon is a very pleasant 68 year old  woman [facility]. 2023 she underwent genetic testing, 47 gene panel, demonstrating BRCA 2 gene mutation and a VUS in POLD1. She has family history of breast cancer in her mother, age 49. She denies breast surgery. She had a left breast core biopsy in 2023, benign. She is undecided on bilateral prophylactic mastectomy, but may consider in the future. She had an ICD placed in 2016 due to low cardiac fraction. She is unable to have an MRI. She follows with bilateral screening ultrasounds. She started Tamoxifen 20mg daily in 2023 for risk reduction. She had a BSO, 2023, benign. She presents today for screening ultrasound and exam. She denies any new masses or lumps. She reports intermittent itching and hives since starting Tamoxifen. She discontinued Tamoxifen on 2024. She states her symptoms have improved, but not completely resolved.      BREAST IMAGIN2023 Bilateral screening mammogram, indicates BI-RADS Category 1. 2023 Bilateral screening ultrasound, indicates BI-RADS Category 1.      FEMALE HISTORY: menarche age 13, , first birth age 22, did not breastfeed, OCP's x 8 years, natural menopause age 54, BSO in 2023, benign, no HRT, almost entirely fatty tissue     FAMILY CANCER HISTORY:  Mother: Breast cancer, age 49  Maternal cousin: Breast cancer, BRCA2+  Father: Head and Neck cancer      Surgical History  She has a past surgical history that includes Other surgical history (2021); Other surgical history (2021); Other surgical history (2021); Other surgical history (2021); Other surgical history (2021); Other surgical history (2021); BI  stereotactic guided breast left localization and biopsy (Left, 12/14/2023); Cardiac catheterization (2003); Insert / replace / remove pacemaker (ICD 2016); Vein Surgery (1982); and Cardiac electrophysiology procedure (Left, 6/5/2024).     Social History  She reports that she quit smoking about 47 years ago. Her smoking use included cigarettes. She started smoking about 53 years ago. She has a 1.5 pack-year smoking history. She has never used smokeless tobacco. She reports current alcohol use. She reports that she does not use drugs.    Family History  Family History   Problem Relation Name Age of Onset    Breast cancer Mother Kerry Santana     Ovarian cancer Mother Kerry Santana     Arthritis Mother Kerry Santana     Breast cancer Mother's Sister      Cancer Father Dheeraj Santana     Heart disease Father Dheeraj Santana         Allergies  Atorvastatin    Medications  Current Outpatient Medications   Medication Instructions    albuterol 90 mcg/actuation inhaler inhalation, Every 4 hours RT    Altace 10 mg capsule 1 capsule, oral, Daily    CALCIUM CARBONATE-VITAMIN D3 ORAL 1 tablet, oral, 2 times daily    carvedilol (COREG) 12.5 mg, oral, 2 times daily    cholecalciferol (Vitamin D-3) 25 MCG (1000 UT) tablet 1 tablet, oral, Daily    furosemide (LASIX) 20 mg, oral, Daily    magnesium oxide (MAG-OX) 400 mg, oral, Daily    mometasone (Nasonex) 50 mcg/actuation nasal spray nasal    multivitamin (Daily Multi-Vitamin) tablet 1 tablet, oral, Daily    omeprazole (PriLOSEC) 40 mg DR capsule oral    rosuvastatin (CRESTOR) 10 mg, oral, Daily    spironolactone (ALDACTONE) 25 mg, oral, Daily    tamoxifen (NOLVADEX) 20 mg, oral, Daily         REVIEW OF SYSTEMS    Constitutional:  Negative for appetite change, fatigue, fever and unexpected weight change.   HENT:  Negative for ear pain, hearing loss, nosebleeds, sore throat and trouble swallowing.    Eyes:  Negative for discharge, itching and visual disturbance.   Respiratory:  Negative for  cough, chest tightness and shortness of breath.    Cardiovascular:  Negative for chest pain, palpitations and leg swelling.   Breast: as indicated in HPI  Gastrointestinal:  Negative for abdominal pain, constipation, diarrhea and nausea.   Endocrine: Negative for cold intolerance and heat intolerance.   Genitourinary:  Negative for dysuria, frequency, hematuria, pelvic pain and vaginal bleeding.   Musculoskeletal:  Negative for arthralgias, back pain, gait problem, joint swelling and myalgias.   Skin:  Negative for color change and rash.   Allergic/Immunologic: Negative for environmental allergies and food allergies.   Neurological:  Negative for dizziness, tremors, speech difficulty, weakness, numbness and headaches.   Hematological:  Does not bruise/bleed easily.   Psychiatric/Behavioral:  Negative for agitation, dysphoric mood and sleep disturbance. The patient is not nervous/anxious.         Past Medical History  She has a past medical history of Asthma ([facility]) and CHF (congestive heart failure) (Multi).     Physical Exam  Patient is alert and oriented x3 and in a relaxed and appropriate mood. Her gait is steady and hand grasps are equal. Sclera is clear. The breasts are nearly symmetrical. The tissue is dense in the central quadrants and softer around without palpable abnormalities, discrete nodules or masses. The nipples appear normal. There is a superficial red raised rash mid chest. There is no cervical, supraclavicular or axillary lymphadenopathy. Heart rate and rhythm normal, S1 and S2 appreciated. The lungs are clear to auscultation bilaterally. Abdomen is soft and non-tender. There is a well-healed incision upper left chest wall with cardiac device in place.     Physical Exam  Chest:              Last Recorded Vitals  Vitals:    07/30/24 0914   BP: 103/57   Pulse: 82   Resp: 16         Relevant Results   Time was spent discussing digital images of the radiology testing with the patient. I explained the  results in depth, along with suggested explanation for follow up recommendations based on the testing results. BI-RADS Category 1    Imaging  Narrative & Impression   Interpreted By:  Jcarlos Brunson and Marshall Colin   STUDY:  BI US BREAST COMPLETE BILATERAL;  7/30/2024 9:56 am      ACCESSION NUMBER(S):  [id number]      ORDERING CLINICIAN:  ORLANDO CERRATO      INDICATION:  Screening.      COMPARISON:  Ultrasound dated 07/25/2023. Mammogram dated 11/28/2023 and  11/25/2022.      FINDINGS:  Homogenous background echotexture - Fibroglandular.      No suspicious mass or areas of suspicious acoustic shadowing are  identified.      IMPRESSION:  No sonographic evidence of malignancy either breast.      BI-RADS CATEGORY:  BI-RADS Category:  1 Negative.  Recommendation:  Annual Screening.  Recommended Date:  1 Year.  Laterality:  Bilateral.       Assessment/Plan   High risk surveillance care, normal clinical exam and imaging, BRCA 2 positive, VUS in POLD1 gene, family history of breast cancer, almost entirely fatty tissue, centrally dense on exam     Plan: Return in December 2024 for bilateral screening mammogram and office visit. Will alternate screening ultrasound/mammogram due to ICD. Continue break from Tamoxifen until 8/11/2024. Contact office following week to discuss recommendations.     Patient Discussion/Summary  Your clinical examination and imaging are normal. Please return in December 2024 for bilateral screening mammogram and office visit or sooner if you have any problems or concerns. Continue your break from Tamoxifen until 8/11/2024. Contact my office that following week to discuss recommendations.     You can see your health information, review clinical summaries from office visits & test results online when you follow your health with MY  Chart, a personal health record. To sign up go to [email or web address]. If you need assistance with signing up or trouble getting into your account call  [facility] Patient Line 24/7 at [phone].    My office phone number is [phone] if you need to get in touch with me or have additional questions or concerns. Thank you for choosing [facility] and trusting me as your healthcare provider. I look forward to seeing you again at your next office visit. I am honored to be a provider on your health care team and I remain dedicated to helping you achieve your health goals.      Olive Rocha, APRN-CNP

## 2024-07-23 ENCOUNTER — TELEPHONE (OUTPATIENT)
Dept: SURGICAL ONCOLOGY | Facility: CLINIC | Age: 69
End: 2024-07-23
Payer: MEDICARE

## 2024-07-23 NOTE — TELEPHONE ENCOUNTER
Ms. Kwon called to inform the office that she has been taking Benadryl for hives she attributes to Tamoxifen.  She noted itchiness to her skin since she started it that has progressively turned into a rash.  She discontinued Tamoxifen Sunday and will speak with Agueda at her follow-up appointment on 7/30/24.

## 2024-07-30 ENCOUNTER — OFFICE VISIT (OUTPATIENT)
Dept: SURGICAL ONCOLOGY | Facility: HOSPITAL | Age: 69
End: 2024-07-30
Payer: MEDICARE

## 2024-07-30 ENCOUNTER — HOSPITAL ENCOUNTER (OUTPATIENT)
Dept: RADIOLOGY | Facility: HOSPITAL | Age: 69
Discharge: HOME | End: 2024-07-30
Payer: MEDICARE

## 2024-07-30 VITALS
RESPIRATION RATE: 16 BRPM | DIASTOLIC BLOOD PRESSURE: 57 MMHG | SYSTOLIC BLOOD PRESSURE: 103 MMHG | HEART RATE: 82 BPM | BODY MASS INDEX: 33.99 KG/M2 | WEIGHT: 180 LBS | HEIGHT: 61 IN

## 2024-07-30 DIAGNOSIS — Z15.09 BRCA2 POSITIVE: ICD-10-CM

## 2024-07-30 DIAGNOSIS — Z12.39 BREAST CANCER SCREENING, HIGH RISK PATIENT: Primary | ICD-10-CM

## 2024-07-30 DIAGNOSIS — R92.2 INCONCLUSIVE MAMMOGRAM: ICD-10-CM

## 2024-07-30 DIAGNOSIS — Z12.31 ENCOUNTER FOR SCREENING MAMMOGRAM FOR MALIGNANT NEOPLASM OF BREAST: ICD-10-CM

## 2024-07-30 DIAGNOSIS — Z79.810 USE OF TAMOXIFEN (NOLVADEX): ICD-10-CM

## 2024-07-30 DIAGNOSIS — Z12.39 BREAST CANCER SCREENING, HIGH RISK PATIENT: ICD-10-CM

## 2024-07-30 DIAGNOSIS — Z15.01 BRCA2 POSITIVE: ICD-10-CM

## 2024-07-30 PROCEDURE — 99213 OFFICE O/P EST LOW 20 MIN: CPT | Performed by: NURSE PRACTITIONER

## 2024-07-30 PROCEDURE — 1159F MED LIST DOCD IN RCRD: CPT | Performed by: NURSE PRACTITIONER

## 2024-07-30 PROCEDURE — 3008F BODY MASS INDEX DOCD: CPT | Performed by: NURSE PRACTITIONER

## 2024-07-30 PROCEDURE — 76641 ULTRASOUND BREAST COMPLETE: CPT | Mod: 50

## 2024-07-30 PROCEDURE — 1036F TOBACCO NON-USER: CPT | Performed by: NURSE PRACTITIONER

## 2024-07-30 PROCEDURE — 76642 ULTRASOUND BREAST LIMITED: CPT | Mod: BILATERAL PROCEDURE | Performed by: STUDENT IN AN ORGANIZED HEALTH CARE EDUCATION/TRAINING PROGRAM

## 2024-07-30 NOTE — PATIENT INSTRUCTIONS
Your clinical examination and imaging are normal. Please return in December 2024 for bilateral screening mammogram and office visit or sooner if you have any problems or concerns. Continue Tamoxifen 20mg daily.     You can see your health information, review clinical summaries from office visits & test results online when you follow your health with MY  Chart, a personal health record. To sign up go to [email or web address]. If you need assistance with signing up or trouble getting into your account call [facility] Patient Line 24/7 at [phone].    My office phone number is [phone] if you need to get in touch with me or have additional questions or concerns. Thank you for choosing [facility] and trusting me as your healthcare provider. I look forward to seeing you again at your next office visit. I am honored to be a provider on your health care team and I remain dedicated to helping you achieve your health goals.

## 2024-08-13 ENCOUNTER — TELEPHONE (OUTPATIENT)
Dept: SURGICAL ONCOLOGY | Facility: HOSPITAL | Age: 69
End: 2024-08-13
Payer: MEDICARE

## 2024-08-13 NOTE — TELEPHONE ENCOUNTER
Pt called stating she has been off of the Tamoxifen for three weeks.  The rash she had is still there a little.  She wanted to know the next step.  Pt was advised per JE to start the Tamoxifen again.  We cannot be sure the rash was caused by the Tamoxifen  w/o trying it again.  Darcy was advised to use hydrocortisone cream if needed.  Darcy stated she is using benadryl with a good  outcome and will continue that if necessary.  Pt was advised to call us back if the rash returns.  Pt understood and agreed with this plan.

## 2024-10-04 ENCOUNTER — HOSPITAL ENCOUNTER (OUTPATIENT)
Dept: CARDIOLOGY | Facility: HOSPITAL | Age: 69
Discharge: HOME | End: 2024-10-04
Payer: MEDICARE

## 2024-10-04 DIAGNOSIS — Z95.810 PRESENCE OF AUTOMATIC CARDIOVERTER/DEFIBRILLATOR (AICD): Primary | ICD-10-CM

## 2024-10-04 DIAGNOSIS — I47.29 VENTRICULAR TACHYCARDIA (PAROXYSMAL) (MULTI): ICD-10-CM

## 2024-10-04 DIAGNOSIS — Z95.810 PRESENCE OF AUTOMATIC CARDIOVERTER/DEFIBRILLATOR (AICD): ICD-10-CM

## 2024-10-04 PROCEDURE — 93296 REM INTERROG EVL PM/IDS: CPT

## 2024-11-04 ENCOUNTER — TELEPHONE (OUTPATIENT)
Dept: CARDIOLOGY | Facility: CLINIC | Age: 69
End: 2024-11-04
Payer: MEDICARE

## 2024-11-04 DIAGNOSIS — Z95.810 PRESENCE OF AUTOMATIC CARDIOVERTER/DEFIBRILLATOR (AICD): Primary | ICD-10-CM

## 2024-11-04 NOTE — TELEPHONE ENCOUNTER
Called and spoke to patient regarding appointment scheduled with Daxa on 12/05. I offered a date a week before her appointment in December but she was no able to do sinc eshe had colonoscopy scheduled for same day. I offered a dated in January but she said she could not do it due to being in [place]. We scheduled for 11/22 with . She had asked if she needed a device check and I informed her I would ask the nurse and let her know.

## 2024-11-04 NOTE — TELEPHONE ENCOUNTER
----- Message from Rubi NORIEGA sent at 11/4/2024  1:48 PM EST -----  Regarding: RE: Device check?  Can you add the order please when you get a chance?  Only remote in active requests.  ----- Message -----  From: Radha Montez RN  Sent: 11/4/2024  12:59 PM EST  To: Rubi Cox  Subject: RE: Device check?                                yes  ----- Message -----  From: Rubi Cox  Sent: 11/4/2024  11:37 AM EST  To: Radha Montez RN  Subject: Device check?                                    Does this patient need a device check with there appointment?

## 2024-11-22 ENCOUNTER — APPOINTMENT (OUTPATIENT)
Dept: CARDIOLOGY | Facility: CLINIC | Age: 69
End: 2024-11-22
Payer: MEDICARE

## 2024-11-22 ENCOUNTER — HOSPITAL ENCOUNTER (OUTPATIENT)
Dept: CARDIOLOGY | Facility: HOSPITAL | Age: 69
Discharge: HOME | End: 2024-11-22
Payer: MEDICARE

## 2024-11-22 VITALS
WEIGHT: 178 LBS | DIASTOLIC BLOOD PRESSURE: 60 MMHG | HEART RATE: 62 BPM | BODY MASS INDEX: 33.61 KG/M2 | SYSTOLIC BLOOD PRESSURE: 132 MMHG | HEIGHT: 61 IN

## 2024-11-22 DIAGNOSIS — E78.5 DYSLIPIDEMIA (HIGH LDL; LOW HDL): ICD-10-CM

## 2024-11-22 DIAGNOSIS — I44.7 LBBB (LEFT BUNDLE BRANCH BLOCK): ICD-10-CM

## 2024-11-22 DIAGNOSIS — I50.9 CONGESTIVE HEART FAILURE, NYHA CLASS 3, UNSPECIFIED CONGESTIVE HEART FAILURE TYPE: ICD-10-CM

## 2024-11-22 DIAGNOSIS — Z95.810 PRESENCE OF AUTOMATIC CARDIOVERTER/DEFIBRILLATOR (AICD): ICD-10-CM

## 2024-11-22 DIAGNOSIS — Z95.810 ICD (IMPLANTABLE CARDIOVERTER-DEFIBRILLATOR), BIVENTRICULAR, IN SITU: Primary | ICD-10-CM

## 2024-11-22 DIAGNOSIS — I42.0 DILATED CARDIOMYOPATHY (MULTI): ICD-10-CM

## 2024-11-22 DIAGNOSIS — Z71.89 ENCOUNTER TO DISCUSS TREATMENT OPTIONS: ICD-10-CM

## 2024-11-22 DIAGNOSIS — Z87.891 FORMER SMOKER: ICD-10-CM

## 2024-11-22 DIAGNOSIS — Z71.89 ENCOUNTER FOR MEDICATION REVIEW AND COUNSELING: ICD-10-CM

## 2024-11-22 PROCEDURE — 3008F BODY MASS INDEX DOCD: CPT | Performed by: INTERNAL MEDICINE

## 2024-11-22 PROCEDURE — 1036F TOBACCO NON-USER: CPT | Performed by: INTERNAL MEDICINE

## 2024-11-22 PROCEDURE — 99214 OFFICE O/P EST MOD 30 MIN: CPT | Performed by: INTERNAL MEDICINE

## 2024-11-22 PROCEDURE — 93284 PRGRMG EVAL IMPLANTABLE DFB: CPT | Performed by: INTERNAL MEDICINE

## 2024-11-22 PROCEDURE — 93284 PRGRMG EVAL IMPLANTABLE DFB: CPT

## 2024-11-22 PROCEDURE — 1159F MED LIST DOCD IN RCRD: CPT | Performed by: INTERNAL MEDICINE

## 2024-11-22 PROCEDURE — 93000 ELECTROCARDIOGRAM COMPLETE: CPT | Mod: DISTINCT PROCEDURAL SERVICE | Performed by: INTERNAL MEDICINE

## 2024-11-22 RX ORDER — FERROUS SULFATE 325(65) MG
325 TABLET ORAL
COMMUNITY

## 2024-11-22 ASSESSMENT — ENCOUNTER SYMPTOMS
PALPITATIONS: 0
DYSPNEA ON EXERTION: 0

## 2024-11-22 NOTE — PATIENT INSTRUCTIONS
Continue same medications/treatment.  Patient educated on proper medication use.  Patient educated on risk factor modification.  Please bring any lab results from other providers/physicians to your next appointment.    Please bring all medicines, vitamins, and herbal supplements with you when you come to the office.    Prescriptions will not be filled unless you are compliant with your follow up appointments or have a follow up appointment scheduled as per instruction of your physician. Refills should be requested at the time of your visit.    Follow up with Dr. Ochoa in 6 months with device check  Continue remote checks at 3 and 9 months    EUGENIO TELLO RN, AM SCRIBING FOR AND IN THE PRESENCE OF DR. JOSE OCHOA MD, FACC, FACP, RS

## 2024-11-22 NOTE — PROGRESS NOTES
"Chief Complaint:   Hospital Follow-up (ICD (implantable cardioverter-defibrillator), biventricular, in situ//)     History Of Present Illness:    Darcy Kwon is a 69 y.o. female presenting with followup.    She feels well.  Patient denies any arrhythmia symptoms of palpitation, lightheadedness, near syncope, or syncope.       Last Recorded Vitals:  Vitals:    11/22/24 0929   BP: 132/60   BP Location: Left arm   Patient Position: Sitting   Pulse: 62   Weight: 80.7 kg (178 lb)   Height: 1.549 m (5' 1\")       Past Medical History:  See List    Past Surgical History:  See List    Social History:  She reports that she quit smoking about 47 years ago. Her smoking use included cigarettes. She started smoking about 53 years ago. She has a 1.5 pack-year smoking history. She has never used smokeless tobacco. She reports current alcohol use. She reports that she does not use drugs.    Family History:  Family History   Problem Relation Name Age of Onset    Breast cancer Mother Kerry Santana     Ovarian cancer Mother Kerry Santana     Arthritis Mother Kerry Santana     Cancer Father Dheeraj Santana     Heart disease Father Dheeraj Santana     Breast cancer Mother's Sister          Allergies:  Atorvastatin    Outpatient Medications:  Current Outpatient Medications   Medication Instructions    albuterol 90 mcg/actuation inhaler Every 4 hours RT    Altace 10 mg capsule 1 capsule, Daily    CALCIUM CARBONATE-VITAMIN D3 ORAL 1 tablet, 2 times daily    carvedilol (COREG) 12.5 mg, oral, 2 times daily    cholecalciferol (Vitamin D-3) 25 MCG (1000 UT) tablet 1 tablet, Daily    ferrous sulfate (325 mg ferrous sulfate) 325 mg, Daily with breakfast    furosemide (LASIX) 20 mg, oral, Daily    magnesium oxide (MAG-OX) 400 mg, oral, Daily    mometasone (Nasonex) 50 mcg/actuation nasal spray Administer into affected nostril(s).    multivitamin (Daily Multi-Vitamin) tablet 1 tablet, Daily    omeprazole (PriLOSEC) 40 mg DR capsule Take by mouth.    " "rosuvastatin (CRESTOR) 10 mg, oral, Daily    spironolactone (ALDACTONE) 25 mg, oral, Daily    tamoxifen (NOLVADEX) 20 mg, oral, Daily   Review of Systems   Cardiovascular:  Negative for chest pain, dyspnea on exertion and palpitations.   All other systems reviewed and are negative.        Physical Exam:  Constitutional:       Appearance: Healthy appearance. Not in distress.   Neck:      Vascular: No JVR. JVD normal.   Pulmonary:      Effort: Pulmonary effort is normal.      Breath sounds: Normal breath sounds. No wheezing. No rhonchi. No rales.   Chest:      Chest wall: Not tender to palpatation.      Comments: Left sided device pocket- healed and well approximated. No swelling or hematoma      Cardiovascular:      PMI at left midclavicular line. Normal rate. Regular rhythm. Normal S1. Normal S2.       Murmurs: There is no murmur.      No gallop.  No click. No rub.   Pulses:     Intact distal pulses.   Edema:     Peripheral edema absent.   Abdominal:      Tenderness: There is no abdominal tenderness.   Musculoskeletal: Normal range of motion.         General: No tenderness. Skin:     General: Skin is warm and dry.   Neurological:      General: No focal deficit present.      Mental Status: Alert and oriented to person, place and time.            Last Labs:  No results found for: \"WBC\", \"HGB\", \"HCT\", \"MCV\", \"PLT\"   No results found for: \"GLUCOSE\", \"CALCIUM\", \"NA\", \"K\", \"CO2\", \"CL\", \"BUN\", \"CREATININE\"   No results found for: \"INR\", \"PROTIME\"       Last Cardiology Tests:  ECG:  ECG 12 Lead 06/05/2024    Today.  Normal sinus rhythm.  Normal axis.  Corrected QT interval.  This is paced) 470 ms.  Appropriate ventricular pacing.    Device check. Med[employer]onic [id number] ICD. Estim longev over 7 years. 99.9% biv pacing. 0 VT. 0 AF.    Lab review: I have personally reviewed the laboratory result(s) see above    Assessment/Plan   Diagnoses and all orders for this visit:  ICD (implantable cardioverter-defibrillator), biventricular, " in situ  Dilated cardiomyopathy (Multi)  LBBB (left bundle branch block)  Dyslipidemia (high LDL; low HDL)  Congestive heart failure, NYHA class 3, unspecified congestive heart failure type  Former smoker  BMI 33.0-33.9,adult  Encounter for medication review and counseling  Encounter to discuss treatment options        Radha Montez RN    Refractory heart failure, primary prevention ICD, underlying left bundle-branch block and sinus node dysfunction, s/p biventricular ICD in 2016 and generator change 2024. Chronic. Stable. Marked improvement in chronic systolic heart failure symptoms after biv ICD. July 2021 stress nuclear test with LVEF 54%.  Tomorrow [id number] CRT-D, Field alert device. Reviewed device check. Previously adjusted AV VV optimization by echocardiographic guidance with previous device. No change in device programming needed presently. Ordered device checks. Will alternate with remote check with device check paired with EP OV.  Chronic systolic heart failure . NYHA II C HF. Chronic. Stable. Reviewed medications. Minimal symptoms with current optimal HF meds. Continue meds. Follows with cardiology. Discussed refills.  Hypertension, benign, chronic, controlled. Stable. Reviewed medications.  Continue medications. Discussed refills.  Hyperlipidemia. Chronic. Stable. On statin.  History of atypical throat and chest pain, normal perfusion by nuclear stress test 2021. Resolved.  History of vein ligation. Chronic right lower extremity edema. Stable. Chronic.  Overweight. Reviewed diet and exercise. Reinforced behavior modification   AHA recommendations for exercise, diet, and behavioral modification reviewed with pt.     Counseling over 50% visit performed. The patient and I discussed the mechanism of arrhythmia, recent generator change, device check, standard of care for device followup, ECG, indications for and types of medications, discussion if and what medication refills needed, treatment options,  risks, benefits, and imponderables. American Heart Association lifestyle changes and behavioral modification discussed. All questions answered in detail.  Patient appreciative of care.

## 2024-11-25 NOTE — PROGRESS NOTES
Darcy Kwon female   1955 69 y.o.   [id number]      Chief Complaint  Annual mammogram and exam, BRCA 2 positive, high risk surveillance care    History Of Present Illness  Darcy Kwon is a very pleasant 69 year old  woman [facility]. 2023 she underwent genetic testing, 47 gene panel, demonstrating BRCA 2 gene mutation and a VUS in POLD1. She has family history of breast cancer in her mother, age 49. She denies breast surgery. She had a left breast core biopsy in 2023, benign. She is undecided on bilateral prophylactic mastectomy, but may consider in the future. She had an ICD placed in  due to low cardiac fraction, since replaced. She is unable to have an MRI. She follows with bilateral screening ultrasounds. She started Tamoxifen 20mg daily in 2023 for risk reduction. She had a BSO, 2023, benign. She discontinued Tamoxifen on 2024 due to intermittent itching and hives and her symptoms improved. She restarted in 2023, currently taking with intermittent tolerable hives. She presents today for annual mammogram and exam. She denies any new masses or lumps.      BREAST IMAGIN2024 Bilateral screening ultrasound, indicates BI-RADS Category 1. 2023 Bilateral screening mammogram, indicates BI-RADS Category 1.     FEMALE HISTORY: menarche age 13, , first birth age 22, did not breastfeed, OCP's x 8 years, natural menopause age 54, BSO in 2023, benign, no HRT, almost entirely fatty tissue     FAMILY CANCER HISTORY:  Mother: Breast cancer, age 49  Maternal cousin: Breast cancer, BRCA2+  Father: Head and Neck cancer      Surgical History  She has a past surgical history that includes Other surgical history (2021); Other surgical history (2021); Other surgical history (2021); Other surgical history (2021); Other surgical history (2021); Other surgical history  (11/23/2021); BI stereotactic guided breast left localization and biopsy (Left, 12/14/2023); Cardiac catheterization (2003); Insert / replace / remove pacemaker (ICD 2016.      GENERATOR EXCHANGE July 5 2024); Vein Surgery (1982); Cardiac electrophysiology procedure (Left, 06/05/2024); Colonoscopy w/ polypectomy (11/26/2024); Vein ligation and stripping; and Breast biopsy.     Social History  She reports that she quit smoking about 47 years ago. Her smoking use included cigarettes. She started smoking about 53 years ago. She has a 1.5 pack-year smoking history. She has never used smokeless tobacco. She reports current alcohol use. She reports that she does not use drugs.    Family History  Family History   Problem Relation Name Age of Onset    Breast cancer Mother Kerry Santana     Ovarian cancer Mother Kerry Santana     Arthritis Mother Kerry Santana     Cancer Father Dheeraj Santana     Heart disease Father Dheeraj Santana     Breast cancer Mother's Sister          Allergies  Atorvastatin    Medications  Current Outpatient Medications   Medication Instructions    albuterol 90 mcg/actuation inhaler Every 4 hours RT    Altace 10 mg capsule 1 capsule, Daily    CALCIUM CARBONATE-VITAMIN D3 ORAL 1 tablet, 2 times daily    carvedilol (COREG) 12.5 mg, oral, 2 times daily    cholecalciferol (Vitamin D-3) 25 MCG (1000 UT) tablet 1 tablet, Daily    diphenhydrAMINE (SOMINEX) 25 mg, Nightly PRN    furosemide (LASIX) 20 mg, oral, Daily    magnesium oxide (MAG-OX) 400 mg, oral, Daily    mometasone (Nasonex) 50 mcg/actuation nasal spray Administer into affected nostril(s).    multivitamin (Daily Multi-Vitamin) tablet 1 tablet, Daily    omeprazole (PriLOSEC) 40 mg DR capsule Take by mouth.    rosuvastatin (CRESTOR) 10 mg, oral, Daily    spironolactone (ALDACTONE) 25 mg, oral, Daily    tamoxifen (NOLVADEX) 20 mg, oral, Daily         REVIEW OF SYSTEMS    Constitutional:  Negative for appetite change, fatigue, fever and unexpected weight  change.   HENT:  Negative for ear pain, hearing loss, nosebleeds, sore throat and trouble swallowing.    Eyes:  Negative for discharge, itching and visual disturbance.   Respiratory:  Negative for cough, chest tightness and shortness of breath.    Cardiovascular:  Negative for chest pain, palpitations and leg swelling.   Breast: as indicated in HPI  Gastrointestinal:  Negative for abdominal pain, constipation, diarrhea and nausea.   Endocrine: Negative for cold intolerance and heat intolerance.   Genitourinary:  Negative for dysuria, frequency, hematuria, pelvic pain and vaginal bleeding.   Musculoskeletal:  Negative for arthralgias, back pain, gait problem, joint swelling and myalgias.   Skin:  Negative for color change and rash.   Allergic/Immunologic: Negative for environmental allergies and food allergies.   Neurological:  Negative for dizziness, tremors, speech difficulty, weakness, numbness and headaches.   Hematological:  Does not bruise/bleed easily.   Psychiatric/Behavioral:  Negative for agitation, dysphoric mood and sleep disturbance. The patient is not nervous/anxious.         Past Medical History  She has a past medical history of Asthma, BRCA2 gene mutation positive, and CHF (congestive heart failure).     Physical Exam  Patient is alert and oriented x3 and in a relaxed and appropriate mood. Her gait is steady and hand grasps are equal. Sclera is clear. The breasts are nearly symmetrical. The tissue is dense in the central quadrants and softer around without palpable abnormalities, discrete nodules or masses. The nipples appear normal. There is a superficial red raised rash mid chest. There is no cervical, supraclavicular or axillary lymphadenopathy. Heart rate and rhythm normal, S1 and S2 appreciated. The lungs are clear to auscultation bilaterally. Abdomen is soft and non-tender. There is a well-healed incision upper left chest wall with cardiac device in place.     Physical Exam  Chest:               Last Recorded Vitals  Vitals:    12/03/24 0940   BP: 100/77   Pulse: 93   Resp: 16           Relevant Results   Time was spent discussing digital images of the radiology testing with the patient. I explained the results in depth, along with suggested explanation for follow up recommendations based on the testing results. BI-RADS Category 2    Imaging    Narrative & Impression   Interpreted By:  Caroline Rincon and Marshall Colin   STUDY:  BI MAMMO BILATERAL DIAGNOSTIC TOMOSYNTHESIS;  12/3/2024 11:02 am      ACCESSION NUMBER(S):  [id number]      ORDERING CLINICIAN:  ORLANDO CERRATO      INDICATION:  Annual mammogram. Benign concordant left breast biopsy of  calcifications on 12/14/2023 with recommendation for a six-month  mammographic follow-up. Pathology demonstrating calcifications in  breast lobules.      ,Z15.01 Genetic susceptibility to malignant neoplasm of breast,Z15.09  Genetic susceptibility to other malignant neoplasm,R92.8 Other  abnormal and inconclusive findings on diagnostic imaging of breast      COMPARISON:  11/28/2023 and 11/25/2022.      FINDINGS:  2D and tomosynthesis images were reviewed at 1 mm slice thickness.      Density:  There are scattered areas of fibroglandular density.      A biopsy tissue marker is again visualized in the superolateral left  breast at posterior depth. Stable benign coarse and round  calcifications are identified adjacent to the biopsy tissue marker in  the superolateral quadrant. No suspicious masses or calcifications  are identified in either breast.      IMPRESSION:  Benign previously biopsied left breast calcifications. No  mammographic evidence of malignancy in either breast. Recommend  return to annual screening mammogram.      BI-RADS CATEGORY:  BI-RADS Category:  2 Benign.  Recommendation:  Annual Screening.  Recommended Date:  1 Year.  Laterality:  Bilateral.         Narrative & Impression   Interpreted By:  Jcarlos Brunson and Marshall Colin    STUDY:  BI US BREAST COMPLETE BILATERAL;  7/30/2024 9:56 am      ACCESSION NUMBER(S):  [id number]      ORDERING CLINICIAN:  ORLANDO CERRATO      INDICATION:  Screening.      COMPARISON:  Ultrasound dated 07/25/2023. Mammogram dated 11/28/2023 and  11/25/2022.      FINDINGS:  Homogenous background echotexture - Fibroglandular.      No suspicious mass or areas of suspicious acoustic shadowing are  identified.      IMPRESSION:  No sonographic evidence of malignancy either breast.      BI-RADS CATEGORY:  BI-RADS Category:  1 Negative.  Recommendation:  Annual Screening.  Recommended Date:  1 Year.  Laterality:  Bilateral.           Assessment/Plan   High risk surveillance care, normal clinical exam and imaging, BRCA 2 positive, VUS in POLD1 gene, family history of breast cancer, almost entirely fatty tissue, centrally dense on exam     Plan: Return in July/August 2025 for bilateral screening ultrasound and office visit. Will continue to alternate screening ultrasound/mammogram due to ICD unless she elects for GILBERTO. Discussed GILBERTO. She plans to contact insurance company to discuss coverage first. Continue Tamoxifen 20mg daily.     Patient Discussion/Summary  Your clinical examination and imaging are normal. Please return in July 2025 for bilateral screening ultrasound and office visit or sooner if you have any problems or concerns. Continue Tamoxifen 20mg daily.     You can see your health information, review clinical summaries from office visits & test results online when you follow your health with MY  Chart, a personal health record. To sign up go to [email or web address]. If you need assistance with signing up or trouble getting into your account call [employer] Patient Line 24/7 at [phone].    My office phone number is [phone] if you need to get in touch with me or have additional questions or concerns. Thank you for choosing [facility] and trusting me as your healthcare provider. I look  forward to seeing you again at your next office visit. I am honored to be a provider on your health care team and I remain dedicated to helping you achieve your health goals.      Olive Rocha, APRN-CNP

## 2024-12-02 ENCOUNTER — APPOINTMENT (OUTPATIENT)
Dept: CARDIOLOGY | Facility: CLINIC | Age: 69
End: 2024-12-02
Payer: MEDICARE

## 2024-12-02 VITALS
HEIGHT: 61 IN | DIASTOLIC BLOOD PRESSURE: 60 MMHG | HEART RATE: 69 BPM | BODY MASS INDEX: 33.42 KG/M2 | SYSTOLIC BLOOD PRESSURE: 96 MMHG | WEIGHT: 177 LBS

## 2024-12-02 DIAGNOSIS — Z87.891 FORMER SMOKER: ICD-10-CM

## 2024-12-02 DIAGNOSIS — R06.02 SHORTNESS OF BREATH: ICD-10-CM

## 2024-12-02 DIAGNOSIS — I44.7 LBBB (LEFT BUNDLE BRANCH BLOCK): ICD-10-CM

## 2024-12-02 DIAGNOSIS — I42.0 DILATED CARDIOMYOPATHY (MULTI): Primary | ICD-10-CM

## 2024-12-02 DIAGNOSIS — I50.20 SYSTOLIC CONGESTIVE HEART FAILURE WITH REDUCED LEFT VENTRICULAR FUNCTION, NYHA CLASS 1: ICD-10-CM

## 2024-12-02 DIAGNOSIS — Z95.810 ICD (IMPLANTABLE CARDIOVERTER-DEFIBRILLATOR), BIVENTRICULAR, IN SITU: ICD-10-CM

## 2024-12-02 PROBLEM — E66.9 OBESITY: Status: RESOLVED | Noted: 2023-11-16 | Resolved: 2024-12-02

## 2024-12-02 PROCEDURE — 1036F TOBACCO NON-USER: CPT | Performed by: INTERNAL MEDICINE

## 2024-12-02 PROCEDURE — 3008F BODY MASS INDEX DOCD: CPT | Performed by: INTERNAL MEDICINE

## 2024-12-02 PROCEDURE — 1160F RVW MEDS BY RX/DR IN RCRD: CPT | Performed by: INTERNAL MEDICINE

## 2024-12-02 PROCEDURE — 99214 OFFICE O/P EST MOD 30 MIN: CPT | Performed by: INTERNAL MEDICINE

## 2024-12-02 PROCEDURE — 93000 ELECTROCARDIOGRAM COMPLETE: CPT | Performed by: INTERNAL MEDICINE

## 2024-12-02 PROCEDURE — G2211 COMPLEX E/M VISIT ADD ON: HCPCS | Performed by: INTERNAL MEDICINE

## 2024-12-02 PROCEDURE — 1159F MED LIST DOCD IN RCRD: CPT | Performed by: INTERNAL MEDICINE

## 2024-12-02 RX ORDER — DIPHENHYDRAMINE HCL 25 MG
25 TABLET ORAL NIGHTLY PRN
COMMUNITY

## 2024-12-02 NOTE — LETTER
December 2, 2024     Amilcar Lopez DO  [address]  [facility] [address]    Patient: Darcy Kwon   YOB: 1955   Date of Visit: 12/2/2024       Dear Dr. Amilcar Lopez DO:    Thank you for referring Darcy Kwon to me for evaluation. Below are my notes for this consultation.  If you have questions, please do not hesitate to call me. I look forward to following your patient along with you.       Sincerely,     Nuha Holland MD      CC: No Recipients  ______________________________________________________________________________________    Subjective   Darcy Kwon is a 69 y.o. female            HPI   Patient here for follow-up continue management for history of nonischemic cardiomyopathy, congestive heart failure and status post AICD implant.  She is a former patient of Dr. Rondon.  Since the last time she was seen she describes stable cardiac status.  She actually describe functional class I.  She denies chest pain, palpitation, lightheadedness, dizziness or syncope.  She has been followed by our EP department.  She has not had any cardiac symptoms recently.  Her recent lab work, device check and previous stress test all noted and reviewed with her.    Assessment    1.  History of nonischemic cardiomyopathy and congestive heart failure currently functional class I no recent echocardiogram  2.  Status post AICD implantation  3.  Obesity with BMI of 33  4.  Hyperlipidemia on treatment and controlled  5.  Previous complaint of shortness of breath markedly improved since she had her AICD placed  6.  Former smoker    Plan    1.  Patient appears to be on good medical therapy for LV systolic function and heart failure  2.  Her recent device check and lab work all noted and reviewed with her  3.  We discussed coronary risk factor modification  4.  I recommended to repeat echocardiogram  5.  I will see her back in follow-up      Review of Systems   All other systems reviewed and are  "negative.           Vitals:    12/02/24 0929   BP: 96/60   BP Location: Left arm   Patient Position: Sitting   Pulse: 69   Weight: 80.3 kg (177 lb)   Height: 1.549 m (5' 1\")      EKG done in office today      Objective   Physical Exam  Constitutional:       Appearance: Normal appearance.   HENT:      Nose: Nose normal.   Neck:      Vascular: No carotid bruit.   Cardiovascular:      Rate and Rhythm: Normal rate.      Pulses: Normal pulses.      Heart sounds: Normal heart sounds.   Pulmonary:      Effort: Pulmonary effort is normal.   Abdominal:      General: Bowel sounds are normal.      Palpations: Abdomen is soft.   Musculoskeletal:         General: Normal range of motion.      Cervical back: Normal range of motion.      Right lower leg: No edema.      Left lower leg: No edema.   Skin:     General: Skin is warm and dry.   Neurological:      General: No focal deficit present.      Mental Status: She is alert.   Psychiatric:         Mood and Affect: Mood normal.         Behavior: Behavior normal.         Thought Content: Thought content normal.         Judgment: Judgment normal.         Allergies  Atorvastatin     Current Medications    Current Outpatient Medications:   •  albuterol 90 mcg/actuation inhaler, Inhale every 4 hours., Disp: , Rfl:   •  Altace 10 mg capsule, Take 1 capsule (10 mg) by mouth once daily., Disp: , Rfl:   •  CALCIUM CARBONATE-VITAMIN D3 ORAL, Take 1 tablet by mouth 2 times a day., Disp: , Rfl:   •  carvedilol (Coreg) 12.5 mg tablet, Take 1 tablet (12.5 mg) by mouth 2 times a day., Disp: 180 tablet, Rfl: 3  •  cholecalciferol (Vitamin D-3) 25 MCG (1000 UT) tablet, Take 1 tablet (25 mcg) by mouth once daily., Disp: , Rfl:   •  diphenhydrAMINE (Sominex) 25 mg tablet, Take 1 tablet (25 mg) by mouth as needed at bedtime for sleep., Disp: , Rfl:   •  ferrous sulfate, 325 mg ferrous sulfate, tablet, Take 1 tablet (325 mg) by mouth once daily with breakfast. (Patient taking differently: Take 1 tablet " (325 mg) by mouth. 1 tablet three times weekly), Disp: , Rfl:   •  furosemide (Lasix) 20 mg tablet, Take 1 tablet (20 mg) by mouth once daily., Disp: 90 tablet, Rfl: 3  •  magnesium oxide (Mag-Ox) 400 mg (241.3 mg magnesium) tablet, Take 1 tablet (400 mg) by mouth once daily., Disp: 90 tablet, Rfl: 3  •  mometasone (Nasonex) 50 mcg/actuation nasal spray, Administer into affected nostril(s)., Disp: , Rfl:   •  multivitamin (Daily Multi-Vitamin) tablet, Take 1 tablet by mouth once daily., Disp: , Rfl:   •  omeprazole (PriLOSEC) 40 mg DR capsule, Take by mouth., Disp: , Rfl:   •  rosuvastatin (Crestor) 10 mg tablet, Take 1 tablet (10 mg) by mouth once daily., Disp: 90 tablet, Rfl: 3  •  spironolactone (Aldactone) 25 mg tablet, Take 1 tablet (25 mg) by mouth once daily., Disp: 90 tablet, Rfl: 3  •  tamoxifen (Nolvadex) 20 mg tablet, Take 1 tablet by mouth once daily, Disp: 90 tablet, Rfl: 3                     Assessment/Plan   1. Dilated cardiomyopathy (Multi)  Transthoracic Echo Complete      2. Systolic congestive heart failure with reduced left ventricular function, NYHA class 1  Follow Up In Cardiology    Follow Up In Cardiology    Transthoracic Echo Complete      3. LBBB (left bundle branch block)  ECG 12 Lead      4. ICD (implantable cardioverter-defibrillator), biventricular, in situ        5. BMI 33.0-33.9,adult        6. Shortness of breath        7. Former smoker                 Scribe Attestation  By signing my name below, Blanca TELLO LPN, Scribe   attest that this documentation has been prepared under the direction and in the presence of Nuha Holland MD.     Provider Attestation - Scribe documentation    All medical record entries made by the Scribe were at my direction and personally dictated by me. I have reviewed the chart and agree that the record accurately reflects my personal performance of the history, physical exam, discussion and plan.

## 2024-12-02 NOTE — PROGRESS NOTES
"Subjective   Darcy Kwon is a 69 y.o. female            HPI   Patient here for follow-up continue management for history of nonischemic cardiomyopathy, congestive heart failure and status post AICD implant.  She is a former patient of Dr. Rondon.  Since the last time she was seen she describes stable cardiac status.  She actually describe functional class I.  She denies chest pain, palpitation, lightheadedness, dizziness or syncope.  She has been followed by our EP department.  She has not had any cardiac symptoms recently.  Her recent lab work, device check and previous stress test all noted and reviewed with her.    Assessment    1.  History of nonischemic cardiomyopathy and congestive heart failure currently functional class I no recent echocardiogram  2.  Status post AICD implantation  3.  Obesity with BMI of 33  4.  Hyperlipidemia on treatment and controlled  5.  Previous complaint of shortness of breath markedly improved since she had her AICD placed  6.  Former smoker    Plan    1.  Patient appears to be on good medical therapy for LV systolic function and heart failure  2.  Her recent device check and lab work all noted and reviewed with her  3.  We discussed coronary risk factor modification  4.  I recommended to repeat echocardiogram  5.  I will see her back in follow-up      Review of Systems   All other systems reviewed and are negative.           Vitals:    12/02/24 0929   BP: 96/60   BP Location: Left arm   Patient Position: Sitting   Pulse: 69   Weight: 80.3 kg (177 lb)   Height: 1.549 m (5' 1\")      EKG done in office today      Objective   Physical Exam  Constitutional:       Appearance: Normal appearance.   HENT:      Nose: Nose normal.   Neck:      Vascular: No carotid bruit.   Cardiovascular:      Rate and Rhythm: Normal rate.      Pulses: Normal pulses.      Heart sounds: Normal heart sounds.   Pulmonary:      Effort: Pulmonary effort is normal.   Abdominal:      General: Bowel sounds are " normal.      Palpations: Abdomen is soft.   Musculoskeletal:         General: Normal range of motion.      Cervical back: Normal range of motion.      Right lower leg: No edema.      Left lower leg: No edema.   Skin:     General: Skin is warm and dry.   Neurological:      General: No focal deficit present.      Mental Status: She is alert.   Psychiatric:         Mood and Affect: Mood normal.         Behavior: Behavior normal.         Thought Content: Thought content normal.         Judgment: Judgment normal.         Allergies  Atorvastatin     Current Medications    Current Outpatient Medications:     albuterol 90 mcg/actuation inhaler, Inhale every 4 hours., Disp: , Rfl:     Altace 10 mg capsule, Take 1 capsule (10 mg) by mouth once daily., Disp: , Rfl:     CALCIUM CARBONATE-VITAMIN D3 ORAL, Take 1 tablet by mouth 2 times a day., Disp: , Rfl:     carvedilol (Coreg) 12.5 mg tablet, Take 1 tablet (12.5 mg) by mouth 2 times a day., Disp: 180 tablet, Rfl: 3    cholecalciferol (Vitamin D-3) 25 MCG (1000 UT) tablet, Take 1 tablet (25 mcg) by mouth once daily., Disp: , Rfl:     diphenhydrAMINE (Sominex) 25 mg tablet, Take 1 tablet (25 mg) by mouth as needed at bedtime for sleep., Disp: , Rfl:     ferrous sulfate, 325 mg ferrous sulfate, tablet, Take 1 tablet (325 mg) by mouth once daily with breakfast. (Patient taking differently: Take 1 tablet (325 mg) by mouth. 1 tablet three times weekly), Disp: , Rfl:     furosemide (Lasix) 20 mg tablet, Take 1 tablet (20 mg) by mouth once daily., Disp: 90 tablet, Rfl: 3    magnesium oxide (Mag-Ox) 400 mg (241.3 mg magnesium) tablet, Take 1 tablet (400 mg) by mouth once daily., Disp: 90 tablet, Rfl: 3    mometasone (Nasonex) 50 mcg/actuation nasal spray, Administer into affected nostril(s)., Disp: , Rfl:     multivitamin (Daily Multi-Vitamin) tablet, Take 1 tablet by mouth once daily., Disp: , Rfl:     omeprazole (PriLOSEC) 40 mg DR capsule, Take by mouth., Disp: , Rfl:      rosuvastatin (Crestor) 10 mg tablet, Take 1 tablet (10 mg) by mouth once daily., Disp: 90 tablet, Rfl: 3    spironolactone (Aldactone) 25 mg tablet, Take 1 tablet (25 mg) by mouth once daily., Disp: 90 tablet, Rfl: 3    tamoxifen (Nolvadex) 20 mg tablet, Take 1 tablet by mouth once daily, Disp: 90 tablet, Rfl: 3                     Assessment/Plan   1. Dilated cardiomyopathy (Multi)  Transthoracic Echo Complete      2. Systolic congestive heart failure with reduced left ventricular function, NYHA class 1  Follow Up In Cardiology    Follow Up In Cardiology    Transthoracic Echo Complete      3. LBBB (left bundle branch block)  ECG 12 Lead      4. ICD (implantable cardioverter-defibrillator), biventricular, in situ        5. BMI 33.0-33.9,adult        6. Shortness of breath        7. Former smoker                 Scribe Attestation  By signing my name below, Blanca TELLO LPN, Scribe   attest that this documentation has been prepared under the direction and in the presence of Nuha Holland MD.     Provider Attestation - Scribe documentation    All medical record entries made by the Scribe were at my direction and personally dictated by me. I have reviewed the chart and agree that the record accurately reflects my personal performance of the history, physical exam, discussion and plan.

## 2024-12-02 NOTE — PATIENT INSTRUCTIONS
Please bring all medicines, vitamins, and herbal supplements with you when you come to the office.    Prescriptions will not be filled unless you are compliant with your follow up appointments or have a follow up appointment scheduled as per instruction of your physician. Refills should be requested at the time of your visit.     Fall Prevention Education Given     BMI was above normal measurement. Current weight: 80.3 kg (177 lb)  Weight change since last visit (-) denotes wt loss -1 lbs   Weight loss needed to achieve BMI 25: 45 Lbs  Weight loss needed to achieve BMI 30: 18.6 Lbs  Provided instructions on dietary changes.    Pacemaker/Defibrillator follow up per routine   Echo  6 months

## 2024-12-03 ENCOUNTER — HOSPITAL ENCOUNTER (OUTPATIENT)
Dept: RADIOLOGY | Facility: HOSPITAL | Age: 69
Discharge: HOME | End: 2024-12-03
Payer: MEDICARE

## 2024-12-03 ENCOUNTER — OFFICE VISIT (OUTPATIENT)
Dept: SURGICAL ONCOLOGY | Facility: HOSPITAL | Age: 69
End: 2024-12-03
Payer: MEDICARE

## 2024-12-03 VITALS
WEIGHT: 175 LBS | HEART RATE: 93 BPM | RESPIRATION RATE: 16 BRPM | BODY MASS INDEX: 33.04 KG/M2 | SYSTOLIC BLOOD PRESSURE: 100 MMHG | HEIGHT: 61 IN | DIASTOLIC BLOOD PRESSURE: 77 MMHG

## 2024-12-03 DIAGNOSIS — Z15.09 BRCA2 POSITIVE: ICD-10-CM

## 2024-12-03 DIAGNOSIS — Z15.09 BRCA2 POSITIVE: Primary | ICD-10-CM

## 2024-12-03 DIAGNOSIS — Z12.31 ENCOUNTER FOR SCREENING MAMMOGRAM FOR MALIGNANT NEOPLASM OF BREAST: ICD-10-CM

## 2024-12-03 DIAGNOSIS — Z12.39 BREAST CANCER SCREENING, HIGH RISK PATIENT: ICD-10-CM

## 2024-12-03 DIAGNOSIS — N64.89 OTHER SPECIFIED DISORDERS OF BREAST: ICD-10-CM

## 2024-12-03 DIAGNOSIS — Z15.01 BRCA2 POSITIVE: ICD-10-CM

## 2024-12-03 DIAGNOSIS — Z15.01 BRCA2 POSITIVE: Primary | ICD-10-CM

## 2024-12-03 DIAGNOSIS — R92.8 OTHER ABNORMAL AND INCONCLUSIVE FINDINGS ON DIAGNOSTIC IMAGING OF BREAST: ICD-10-CM

## 2024-12-03 PROCEDURE — 1159F MED LIST DOCD IN RCRD: CPT | Performed by: NURSE PRACTITIONER

## 2024-12-03 PROCEDURE — 77066 DX MAMMO INCL CAD BI: CPT | Performed by: RADIOLOGY

## 2024-12-03 PROCEDURE — 1036F TOBACCO NON-USER: CPT | Performed by: NURSE PRACTITIONER

## 2024-12-03 PROCEDURE — G0279 TOMOSYNTHESIS, MAMMO: HCPCS | Performed by: RADIOLOGY

## 2024-12-03 PROCEDURE — 99213 OFFICE O/P EST LOW 20 MIN: CPT | Performed by: NURSE PRACTITIONER

## 2024-12-03 PROCEDURE — 77066 DX MAMMO INCL CAD BI: CPT

## 2024-12-03 PROCEDURE — 3008F BODY MASS INDEX DOCD: CPT | Performed by: NURSE PRACTITIONER

## 2024-12-03 NOTE — PATIENT INSTRUCTIONS
Your clinical examination and imaging are normal. Please return in July 2025 for bilateral screening ultrasound and office visit or sooner if you have any problems or concerns. Continue Tamoxifen 20mg daily.     You can see your health information, review clinical summaries from office visits & test results online when you follow your health with MY  Chart, a personal health record. To sign up go to [email or web address]. If you need assistance with signing up or trouble getting into your account call [employer] Patient Line 24/7 at [phone].    My office phone number is [phone] if you need to get in touch with me or have additional questions or concerns. Thank you for choosing [facility] and trusting me as your healthcare provider. I look forward to seeing you again at your next office visit. I am honored to be a provider on your health care team and I remain dedicated to helping you achieve your health goals.

## 2024-12-05 ENCOUNTER — APPOINTMENT (OUTPATIENT)
Dept: CARDIOLOGY | Facility: CLINIC | Age: 69
End: 2024-12-05
Payer: MEDICARE

## 2024-12-16 PROCEDURE — 93306 TTE W/DOPPLER COMPLETE: CPT | Performed by: INTERNAL MEDICINE

## 2024-12-18 ENCOUNTER — TELEPHONE (OUTPATIENT)
Dept: CARDIOLOGY | Facility: CLINIC | Age: 69
End: 2024-12-18
Payer: MEDICARE

## 2024-12-18 NOTE — TELEPHONE ENCOUNTER
Patient phoned inquiring result of echo she had done at the hospital yesterday. Patient inquiring change to her ejection fraction versus the EF on her past stress test. TO Dr. Nuha Holland MD

## 2025-02-24 ENCOUNTER — HOSPITAL ENCOUNTER (OUTPATIENT)
Dept: CARDIOLOGY | Facility: HOSPITAL | Age: 70
Discharge: HOME | End: 2025-02-24
Payer: MEDICARE

## 2025-02-24 DIAGNOSIS — Z95.810 ICD (IMPLANTABLE CARDIOVERTER-DEFIBRILLATOR), BIVENTRICULAR, IN SITU: ICD-10-CM

## 2025-02-24 PROCEDURE — 93295 DEV INTERROG REMOTE 1/2/MLT: CPT | Performed by: INTERNAL MEDICINE

## 2025-02-24 PROCEDURE — 93296 REM INTERROG EVL PM/IDS: CPT

## 2025-03-28 DIAGNOSIS — I47.29 VENTRICULAR TACHYCARDIA (PAROXYSMAL): ICD-10-CM

## 2025-03-28 DIAGNOSIS — Z00.00 HEALTHCARE MAINTENANCE: ICD-10-CM

## 2025-03-28 DIAGNOSIS — I42.0 DILATED CARDIOMYOPATHY (MULTI): ICD-10-CM

## 2025-03-28 RX ORDER — LANOLIN ALCOHOL/MO/W.PET/CERES
1 CREAM (GRAM) TOPICAL DAILY
Qty: 90 TABLET | Refills: 3 | Status: SHIPPED | OUTPATIENT
Start: 2025-03-28 | End: 2026-03-28

## 2025-03-28 NOTE — TELEPHONE ENCOUNTER
Received request for prescription refills for patient.   Patient follows with Dr. Ochoa     Request is for MagOx  Is patient currently on medication yes    Last OV 11/22/24  Next OV 5/27/25    Pended for signing and sent to provider

## 2025-05-27 ENCOUNTER — HOSPITAL ENCOUNTER (OUTPATIENT)
Dept: CARDIOLOGY | Facility: HOSPITAL | Age: 70
Discharge: HOME | End: 2025-05-27
Payer: MEDICARE

## 2025-05-27 ENCOUNTER — OFFICE VISIT (OUTPATIENT)
Dept: CARDIOLOGY | Facility: CLINIC | Age: 70
End: 2025-05-27
Payer: MEDICARE

## 2025-05-27 ENCOUNTER — APPOINTMENT (OUTPATIENT)
Dept: CARDIOLOGY | Facility: CLINIC | Age: 70
End: 2025-05-27
Payer: MEDICARE

## 2025-05-27 VITALS
SYSTOLIC BLOOD PRESSURE: 126 MMHG | DIASTOLIC BLOOD PRESSURE: 66 MMHG | HEART RATE: 64 BPM | HEIGHT: 61 IN | WEIGHT: 172 LBS | BODY MASS INDEX: 32.47 KG/M2

## 2025-05-27 DIAGNOSIS — Z95.810 ICD (IMPLANTABLE CARDIOVERTER-DEFIBRILLATOR), BIVENTRICULAR, IN SITU: ICD-10-CM

## 2025-05-27 DIAGNOSIS — Z95.810 ICD (IMPLANTABLE CARDIOVERTER-DEFIBRILLATOR), BIVENTRICULAR, IN SITU: Primary | ICD-10-CM

## 2025-05-27 DIAGNOSIS — I42.0 DILATED CARDIOMYOPATHY (MULTI): ICD-10-CM

## 2025-05-27 DIAGNOSIS — I44.7 LBBB (LEFT BUNDLE BRANCH BLOCK): ICD-10-CM

## 2025-05-27 PROCEDURE — 1159F MED LIST DOCD IN RCRD: CPT | Performed by: PHYSICIAN ASSISTANT

## 2025-05-27 PROCEDURE — 93000 ELECTROCARDIOGRAM COMPLETE: CPT | Mod: DISTINCT PROCEDURAL SERVICE | Performed by: INTERNAL MEDICINE

## 2025-05-27 PROCEDURE — 3008F BODY MASS INDEX DOCD: CPT | Performed by: PHYSICIAN ASSISTANT

## 2025-05-27 PROCEDURE — 1036F TOBACCO NON-USER: CPT | Performed by: PHYSICIAN ASSISTANT

## 2025-05-27 PROCEDURE — 93284 PRGRMG EVAL IMPLANTABLE DFB: CPT

## 2025-05-27 PROCEDURE — 93284 PRGRMG EVAL IMPLANTABLE DFB: CPT | Performed by: INTERNAL MEDICINE

## 2025-05-27 PROCEDURE — 99213 OFFICE O/P EST LOW 20 MIN: CPT | Performed by: PHYSICIAN ASSISTANT

## 2025-05-27 NOTE — PROGRESS NOTES
Electrophysiology Office Visit     CHIEF COMPLAINT  Chief Complaint   Patient presents with    Follow-up     6 month with device check      Primary EP doctor: Dr Ochoa  Primary Cardiologist: Dr Holland     EP History: Dilated cardiomyopathy s/p ICD (2016), EF currently 30-35% in 2024 (previously 15% in 2016), LBBB, HTN  9/28/2016 ICD implanted Due to cardiomyopathy, heart failure  6/5/2024 ICD generator exchange ([facility])  No EP meds    HPI: 5/27/2025  69 year old female pmhx Dilated cardiomyopathy s/p ICD (2016), EF % currently 30-35% in 2024 (previously 15% in 2016), LBBB, HTN.    Here for ICD device check.  Medtronic DTMA1 QC, ICD implanted for VT and cardiomyopathy.  Battery longevity 6 years 6 months, RA pacing 17.35%, RV pacing 99.91%, LV pacing 99.88%, CRT pacing 99.87%.  0 AT AF episodes with 0% burden.  1 nonsustained ventricular tachycardia detection with no therapies delivered. 11:26 AM on May 9th for 2 sec.   She thinks she might have forgot to take some of her cardiac medications. She did have a little throat tightness.     No cardiac concerns. She walked around [place] yesterday without any cardiac symptoms.     Today's EKG Interpretation: AV dual paced rhythm, rate 64 bpm, HI interval 136 ms, QRS duration 150 ms, QTc 478 ms    VITALS  Vitals:    05/27/25 1011   BP: 126/66   Pulse: 64     Wt Readings from Last 4 Encounters:   05/27/25 78 kg (172 lb)   12/03/24 79.4 kg (175 lb)   12/02/24 80.3 kg (177 lb)   11/22/24 80.7 kg (178 lb)     PHYSICAL EXAM:  GENERAL:  Well developed, well nourished, in no acute distress.  NEURO/PSYCH:  No focal deficits. A&O x 3   LUNGS:  Clear to auscultation bilaterally. No wheezing, rhonci, or crackles  HEART:  RRR, no M/R/G.   EXTREMITIES:  Warm with good color, no clubbing or cyanosis.  No pitting edema.     Medical History[1]  Surgical History[2]  Social History[3]  Family History[4]  RX Allergies[5]   Current Outpatient Medications   Medication Instructions     albuterol 90 mcg/actuation inhaler Every 4 hours RT    Altace 10 mg capsule 1 capsule, Daily    CALCIUM CARBONATE-VITAMIN D3 ORAL 1 tablet, 2 times daily    carvedilol (COREG) 12.5 mg, oral, 2 times daily    cholecalciferol (Vitamin D-3) 25 MCG (1000 UT) tablet 1 tablet, Daily    diphenhydrAMINE (SOMINEX) 25 mg, Nightly PRN    furosemide (LASIX) 20 mg, oral, Daily    magnesium oxide (MAG-OX) 400 mg, oral, Daily    mometasone (Nasonex) 50 mcg/actuation nasal spray Administer into affected nostril(s).    multivitamin (Daily Multi-Vitamin) tablet 1 tablet, Daily    omeprazole (PriLOSEC) 40 mg DR capsule Take by mouth.    rosuvastatin (CRESTOR) 10 mg, oral, Daily    spironolactone (ALDACTONE) 25 mg, oral, Daily    tamoxifen (NOLVADEX) 20 mg, oral, Daily    vit A/vit C/vit E/zinc/copper (ICAPS AREDS ORAL) Take by mouth.      Relevant reports:   12/16/2024 ECHO  The left ventricular size and thickness are normal.    Septal motion is consistent with conduction abnormality.    A variety of Doppler measurements indicate impaired left ventricular    relaxation, which is associated with grade I/IV or mild diastolic dysfunction.    Ejection Fraction = 30-35%.    There is a pacemaker lead in the right ventricle.    Compared to prior echo report on 4/25/2017, changes are noted.    The left ventricle ejection fraction has improved      8/7/2018 ECHO  EF 35-40%  LV cavity size is moderately dilated    5/23/2017 ECHO  1. Moderate to severely dilated left ventricular cavity with ejection fraction of about 30%.  2. No changes were made to the BiV ICD with setting of adaptive LV only giving the best VTI.    4/25/2017 ECHO  EF 15-20%  Severe global hypokinesis of the left ventricle  Septal dyskinesis  Left atrium mildly dilated  Trace tricuspid regurgitation    8/2/2016 ECHO  EF 15%  Septal dyskinesis  Severe global hypokinesis of the left ventricle  Left atrium mildly dilated  Trace tricuspid regurgitation    3/4/2015 ECHO  Left  ventricle mildly dilated  EF 20-25%  Severe global hypokinesis left ventricle  Septal motion is consistent with conduction abnormality  Mild mitral regurgitation and tricuspid regurgitation    10/22/2007 ECHO  EF 30-35%    7/1/2021 Nuclear Stress Test  Normal.  No ischemia or MI.  Normal LVEF, EF 54%    ASSESSMENT AND PLAN  Problem List Items Addressed This Visit       Dilated cardiomyopathy (Multi)    Relevant Orders    Cardiac Device Check - Remote    Cardiac Device Check - In Clinic    Cardiac Device Check - In Clinic    Follow Up In Cardiology    ICD (implantable cardioverter-defibrillator), biventricular, in situ - Primary  Pt doing well.  RTO in 6 months with Dr Ochoa and in-clinic ICD check.  Continue with rotating remote and in clinic device checks every 3 months    Relevant Orders    Cardiac Device Check - Remote    Cardiac Device Check - In Clinic    Cardiac Device Check - In Clinic    Follow Up In Cardiology    LBBB (left bundle branch block)    Relevant Orders    ECG 12 lead (Clinic Performed)        GREG Pablo, PA-C             [1]   Past Medical History:  Diagnosis Date    Asthma     BRCA2 gene mutation positive     CHF (congestive heart failure)    [2]   Past Surgical History:  Procedure Laterality Date    BI STEREOTACTIC GUIDED BREAST LEFT LOCALIZATION AND BIOPSY Left 12/14/2023    BI STEREOTACTIC GUIDED BREAST LEFT LOCALIZATION AND BIOPSY 12/14/2023 Caroline Rincon MD [facility]    BREAST BIOPSY      CARDIAC CATHETERIZATION  2003    CARDIAC ELECTROPHYSIOLOGY PROCEDURE Left 06/05/2024    Procedure: ICD BIV Generator Change Out;  Surgeon: Yareli Ochoa MD;  Location: [facility];  Service: Electrophysiology;  Laterality: Left;  Patient requests the old device after its been santized  Patient not at [facility] yet    COLONOSCOPY W/ POLYPECTOMY  11/26/2024    INSERT / REPLACE / REMOVE PACEMAKER  ICD 2016.      GENERATOR EXCHANGE July 5 2024    OOPHORECTOMY  Sept 22 2023    OTHER SURGICAL  HISTORY  2021    Cataract surgery    OTHER SURGICAL HISTORY  2021    Blepharoplasty    OTHER SURGICAL HISTORY  2021    Complete colonoscopy    OTHER SURGICAL HISTORY  2021    Cardioverter defibrillator insertion    OTHER SURGICAL HISTORY  2021    Surgery    OTHER SURGICAL HISTORY  2021    Tonsillectomy    VEIN LIGATION AND STRIPPING      VEIN SURGERY     [3]   Social History  Tobacco Use    Smoking status: Former     Current packs/day: 0.00     Average packs/day: 0.3 packs/day for 6.0 years (1.5 ttl pk-yrs)     Types: Cigarettes     Start date: 1971     Quit date: 1977     Years since quittin.0    Smokeless tobacco: Never   Substance Use Topics    Alcohol use: Yes     Comment: Occ drink with dinner outing    Drug use: Never   [4]   Family History  Problem Relation Name Age of Onset    Breast cancer Mother Kerry Santana     Ovarian cancer Mother Kerry Santana     Arthritis Mother Kerry Santana     Cancer Father Dheerajsteve Santana     Heart disease Father Dheerajsteve Santana     Breast cancer Mother's Sister     [5]   Allergies  Allergen Reactions    Atorvastatin Myalgia

## 2025-05-27 NOTE — PATIENT INSTRUCTIONS
Great to see you today.   Please take your medications and or do life style behavior modifications as discussed.   Please make appointment in 6 months with Dr Ochoa and ICD device check  Please call if you have any questions or concerns.  Please go to emergency department if you have abrupt onset of chest, shortness of breath, light headedness or dizziness.

## 2025-06-07 DIAGNOSIS — Z12.39 BREAST CANCER SCREENING, HIGH RISK PATIENT: ICD-10-CM

## 2025-06-09 RX ORDER — TAMOXIFEN CITRATE 20 MG/1
20 TABLET ORAL DAILY
Qty: 90 TABLET | Refills: 3 | Status: SHIPPED | OUTPATIENT
Start: 2025-06-09

## 2025-06-19 ENCOUNTER — APPOINTMENT (OUTPATIENT)
Dept: CARDIOLOGY | Facility: CLINIC | Age: 70
End: 2025-06-19
Payer: MEDICARE

## 2025-06-19 VITALS
WEIGHT: 177 LBS | HEART RATE: 68 BPM | BODY MASS INDEX: 33.42 KG/M2 | DIASTOLIC BLOOD PRESSURE: 80 MMHG | HEIGHT: 61 IN | SYSTOLIC BLOOD PRESSURE: 120 MMHG

## 2025-06-19 DIAGNOSIS — I44.7 LBBB (LEFT BUNDLE BRANCH BLOCK): ICD-10-CM

## 2025-06-19 DIAGNOSIS — Z87.891 FORMER SMOKER: ICD-10-CM

## 2025-06-19 DIAGNOSIS — Z95.810 ICD (IMPLANTABLE CARDIOVERTER-DEFIBRILLATOR), BIVENTRICULAR, IN SITU: ICD-10-CM

## 2025-06-19 DIAGNOSIS — I50.20 SYSTOLIC CONGESTIVE HEART FAILURE WITH REDUCED LEFT VENTRICULAR FUNCTION, NYHA CLASS 1: ICD-10-CM

## 2025-06-19 DIAGNOSIS — I42.0 DILATED CARDIOMYOPATHY (MULTI): ICD-10-CM

## 2025-06-19 DIAGNOSIS — I50.22 CHRONIC HEART FAILURE WITH REDUCED EJECTION FRACTION (HFREF, <= 40%): Primary | ICD-10-CM

## 2025-06-19 DIAGNOSIS — R06.02 SHORTNESS OF BREATH: ICD-10-CM

## 2025-06-19 DIAGNOSIS — R05.9 COUGH, UNSPECIFIED TYPE: ICD-10-CM

## 2025-06-19 DIAGNOSIS — E78.5 DYSLIPIDEMIA (HIGH LDL; LOW HDL): ICD-10-CM

## 2025-06-19 PROBLEM — I50.9 CHF NYHA CLASS III (MULTI): Status: RESOLVED | Noted: 2023-11-16 | Resolved: 2025-06-19

## 2025-06-19 PROCEDURE — 99214 OFFICE O/P EST MOD 30 MIN: CPT | Performed by: INTERNAL MEDICINE

## 2025-06-19 PROCEDURE — 1160F RVW MEDS BY RX/DR IN RCRD: CPT | Performed by: INTERNAL MEDICINE

## 2025-06-19 PROCEDURE — 1036F TOBACCO NON-USER: CPT | Performed by: INTERNAL MEDICINE

## 2025-06-19 PROCEDURE — G2211 COMPLEX E/M VISIT ADD ON: HCPCS | Performed by: INTERNAL MEDICINE

## 2025-06-19 PROCEDURE — 3008F BODY MASS INDEX DOCD: CPT | Performed by: INTERNAL MEDICINE

## 2025-06-19 PROCEDURE — 1159F MED LIST DOCD IN RCRD: CPT | Performed by: INTERNAL MEDICINE

## 2025-06-19 RX ORDER — VALSARTAN 160 MG/1
160 TABLET ORAL DAILY
Qty: 90 TABLET | Refills: 3 | Status: SHIPPED | OUTPATIENT
Start: 2025-06-19 | End: 2026-06-19

## 2025-06-19 NOTE — PROGRESS NOTES
Chief Complaint   Patient presents with    Follow-up     7 month ICD (implantable cardioverter-defibrillator), biventricular, in situ       Subjective   Darcy Kwon is a 69 y.o. female     HPI   Patient is here for follow-up continue management for history of nonischemic cardiomyopathy and congestive heart failure, status post AICD, obesity and hyperlipidemia.  Since last time I saw her she is doing well.  She is luca functional class II.  She denies complaint of chest pain, palpitation, lightheadedness, dizziness or syncope.  She is reasonably active.  Her recent device check noted and reviewed with her.  Her last lab work from last year was noted and reviewed with her.  She is complaining of cough which I believe related to ACE  Assessment     1.  History of nonischemic cardiomyopathy and congestive heart failure currently functional class 2.  Last echo was last year  2.  Status post AICD implantation with Jhoan with ejection fraction of 30-35% currently functional class II.  She is on good medical therapy for heart failure including ACE and beta-blocker  3.  Obesity with BMI of 33 with no weight changes  4.  Hyperlipidemia on treatment rosuvastatin and controlled  5.  Previous complaint of shortness of breath markedly improved since she had her AICD placed  6.  Former smoker  7.  Cough likely due to ACE inhibitor's     Plan     1.  Patient appears to be on good medical therapy for LV systolic function and heart failure I will however switch her from ramipril to Diovan in view of recent cough  2.  Her recent device check and lab work all noted and reviewed with her  3.  We discussed coronary risk factor modification  4.  I recommended to repeat echocardiogram prior to next office visit  5.  I will see her back in follow-up next year  Review of Systems   All other systems reviewed and are negative.           Vitals:    06/19/25 1412   BP: 120/80   BP Location: Left arm   Patient Position: Sitting   Pulse: 68  "  Weight: 80.3 kg (177 lb)   Height: 1.549 m (5' 1\")        Objective   Physical Exam  Constitutional:       Appearance: Normal appearance.   HENT:      Nose: Nose normal.   Neck:      Vascular: No carotid bruit.   Cardiovascular:      Rate and Rhythm: Normal rate.      Pulses: Normal pulses.      Heart sounds: Normal heart sounds.   Pulmonary:      Effort: Pulmonary effort is normal.   Abdominal:      General: Bowel sounds are normal.      Palpations: Abdomen is soft.   Musculoskeletal:         General: Normal range of motion.      Cervical back: Normal range of motion.      Right lower leg: No edema.      Left lower leg: No edema.   Skin:     General: Skin is warm and dry.   Neurological:      General: No focal deficit present.      Mental Status: She is alert.   Psychiatric:         Mood and Affect: Mood normal.         Behavior: Behavior normal.         Thought Content: Thought content normal.         Judgment: Judgment normal.         Allergies  Atorvastatin     Current Medications  Current Outpatient Medications   Medication Instructions    albuterol 90 mcg/actuation inhaler Every 4 hours RT    CALCIUM CARBONATE-VITAMIN D3 ORAL 1 tablet, 2 times daily    carvedilol (COREG) 12.5 mg, oral, 2 times daily    cholecalciferol (Vitamin D-3) 25 MCG (1000 UT) tablet 1 tablet, Daily    diphenhydrAMINE (SOMINEX) 25 mg, Nightly PRN    furosemide (LASIX) 20 mg, oral, Daily    magnesium oxide (MAG-OX) 400 mg, oral, Daily    mometasone (Nasonex) 50 mcg/actuation nasal spray Administer into affected nostril(s).    multivitamin (Daily Multi-Vitamin) tablet 1 tablet, Daily    omeprazole (PriLOSEC) 40 mg DR capsule Take by mouth.    rosuvastatin (CRESTOR) 10 mg, oral, Daily    spironolactone (ALDACTONE) 25 mg, oral, Daily    tamoxifen (NOLVADEX) 20 mg, oral, Daily    vit A/vit C/vit E/zinc/copper (ICAPS AREDS ORAL) Take by mouth.                        Assessment/Plan   1. Dilated cardiomyopathy (Multi)        2. Systolic " congestive heart failure with reduced left ventricular function, NYHA class 1  Follow Up In Cardiology      3. LBBB (left bundle branch block)        4. ICD (implantable cardioverter-defibrillator), biventricular, in situ        5. Dyslipidemia (high LDL; low HDL)        6. BMI 33.0-33.9,adult        7. Shortness of breath        8. Former smoker        9. Cough, unspecified type                 Scribe Attestation  By signing my name below, Blanca TELLO LPN, Scribe   attest that this documentation has been prepared under the direction and in the presence of Nuha Holland MD.     Provider Attestation - Scribe documentation    All medical record entries made by the Scribe were at my direction and personally dictated by me. I have reviewed the chart and agree that the record accurately reflects my personal performance of the history, physical exam, discussion and plan.

## 2025-06-19 NOTE — PATIENT INSTRUCTIONS
Please bring all medicines, vitamins, and herbal supplements with you when you come to the office.    Prescriptions will not be filled unless you are compliant with your follow up appointments or have a follow up appointment scheduled as per instruction of your physician. Refills should be requested at the time of your visit.     Stop altace  Start valsartan  Echo 8 months with visit to follow up  Pacemaker/Defibrillator follow up per routine   BMI was above normal measurement. Current weight: 80.3 kg (177 lb)  Weight change since last visit (-) denotes wt loss 5 lbs   Weight loss needed to achieve BMI 25: 45 Lbs  Weight loss needed to achieve BMI 30: 18.6 Lbs  Provided instructions on dietary changes  Provided instructions on exercise

## 2025-06-25 DIAGNOSIS — I50.20 SYSTOLIC CONGESTIVE HEART FAILURE WITH REDUCED LEFT VENTRICULAR FUNCTION, NYHA CLASS 1: ICD-10-CM

## 2025-06-25 DIAGNOSIS — E78.5 DYSLIPIDEMIA (HIGH LDL; LOW HDL): ICD-10-CM

## 2025-06-26 RX ORDER — ROSUVASTATIN CALCIUM 10 MG/1
10 TABLET, COATED ORAL DAILY
Qty: 90 TABLET | Refills: 3 | Status: SHIPPED | OUTPATIENT
Start: 2025-06-26

## 2025-06-26 RX ORDER — FUROSEMIDE 20 MG/1
20 TABLET ORAL DAILY
Qty: 90 TABLET | Refills: 3 | Status: SHIPPED | OUTPATIENT
Start: 2025-06-26

## 2025-07-08 DIAGNOSIS — I50.20 SYSTOLIC CONGESTIVE HEART FAILURE WITH REDUCED LEFT VENTRICULAR FUNCTION, NYHA CLASS 1: ICD-10-CM

## 2025-07-09 RX ORDER — SPIRONOLACTONE 25 MG/1
25 TABLET ORAL DAILY
Qty: 90 TABLET | Refills: 3 | Status: SHIPPED | OUTPATIENT
Start: 2025-07-09 | End: 2026-07-09

## 2025-07-18 NOTE — PROGRESS NOTES
Darcy Kwon female   1955 69 y.o.   [id number]      Chief Complaint  Clinical exam, BRCA 2 positive, high risk surveillance care    History Of Present Illness  Darcy Kwon is a very pleasant 69 year old  woman [facility]. 2023 she underwent genetic testing, 47 gene panel, demonstrating BRCA 2 gene mutation and a VUS in POLD1. She has family history of breast cancer in her mother, age 49. She denies breast surgery. She had a left breast core biopsy in 2023, benign. She is undecided on bilateral prophylactic mastectomy, but may consider in the future. She had an ICD placed in  due to low cardiac fraction, since replaced. She is unable to have an MRI. She follows with bilateral screening ultrasounds. She started Tamoxifen 20mg daily in 2023 for risk reduction. She had a BSO, 2023, benign. She discontinued Tamoxifen on 2024 due to intermittent itching and hives and her symptoms improved. She restarted in 2023, currently taking with intermittent tolerable hives. She presents today for clinical exam and screening ultrasound. She denies any new masses or lumps.      BREAST IMAGIN/3/2024 Bilateral screening mammogram, indicates BI-RADS Category 1. 2024 Bilateral screening ultrasound, indicates BI-RADS Category 1.     FEMALE HISTORY: menarche age 13, , first birth age 22, did not breastfeed, OCP's x 8 years, natural menopause age 54, BSO in 2023, benign, no HRT, almost entirely fatty tissue     FAMILY CANCER HISTORY:  Mother: Breast cancer, age 49  Maternal Cousin: Breast cancer, BRCA2+  Father: Head and Neck cancer      Surgical History  She has a past surgical history that includes Other surgical history (2021); Other surgical history (2021); Other surgical history (2021); Other surgical history (2021); Other surgical history (2021); Other surgical history  (11/23/2021); BI stereotactic guided breast left localization and biopsy (Left, 12/14/2023); Cardiac catheterization (2003); Insert / replace / remove pacemaker (ICD 2016.      GENERATOR EXCHANGE July 5 2024); Vein Surgery (1982); Cardiac electrophysiology procedure (Left, 06/05/2024); Colonoscopy w/ polypectomy (11/26/2024); Vein ligation and stripping; Breast biopsy (2023); Oophorectomy (Sept 22 2023); and Colonoscopy w/ biopsies (11/2024).     Social History  She reports that she quit smoking about 48 years ago. Her smoking use included cigarettes. She started smoking about 54 years ago. She has a 1.8 pack-year smoking history. She has never used smokeless tobacco. She reports current alcohol use. She reports that she does not use drugs.    Family History  Family History   Problem Relation Name Age of Onset    Breast cancer Mother Kerry Santana     Ovarian cancer Mother Kerry Santana     Arthritis Mother Kerry Santana     Cancer Father Dheeraj Santana     Heart disease Father Dheeraj Santana     Breast cancer Mother's Sister          Allergies  Atorvastatin    Medications  Current Outpatient Medications   Medication Instructions    albuterol 90 mcg/actuation inhaler Every 4 hours RT    CALCIUM CARBONATE-VITAMIN D3 ORAL 1 tablet, 2 times daily    carvedilol (COREG) 12.5 mg, oral, 2 times daily    cholecalciferol (Vitamin D-3) 25 MCG (1000 UT) tablet 1 tablet, Daily    diphenhydrAMINE (SOMINEX) 25 mg, Nightly PRN    furosemide (LASIX) 20 mg, oral, Daily    magnesium oxide (MAG-OX) 400 mg, oral, Daily    mometasone (Nasonex) 50 mcg/actuation nasal spray Administer into affected nostril(s).    multivitamin (Daily Multi-Vitamin) tablet 1 tablet, Daily    omeprazole (PriLOSEC) 40 mg DR capsule Take by mouth.    rosuvastatin (CRESTOR) 10 mg, oral, Daily    spironolactone (ALDACTONE) 25 mg, oral, Daily    tamoxifen (NOLVADEX) 20 mg, oral, Daily    valsartan (DIOVAN) 160 mg, oral, Daily    vit A/vit C/vit E/zinc/copper (ICAPS  AREDS ORAL) Take by mouth.         REVIEW OF SYSTEMS    Constitutional:  Negative for appetite change, fatigue, fever and unexpected weight change.   HENT:  Negative for ear pain, hearing loss, nosebleeds, sore throat and trouble swallowing.    Eyes:  Negative for discharge, itching and visual disturbance.   Respiratory:  Negative for cough, chest tightness and shortness of breath.    Cardiovascular:  Negative for chest pain, palpitations and leg swelling.   Breast: as indicated in HPI  Gastrointestinal:  Negative for abdominal pain, constipation, diarrhea and nausea.   Endocrine: Negative for cold intolerance and heat intolerance.   Genitourinary:  Negative for dysuria, frequency, hematuria, pelvic pain and vaginal bleeding.   Musculoskeletal:  Negative for arthralgias, back pain, gait problem, joint swelling and myalgias.   Skin:  Negative for color change and rash.   Allergic/Immunologic: Negative for environmental allergies and food allergies.   Neurological:  Negative for dizziness, tremors, speech difficulty, weakness, numbness and headaches.   Hematological:  Does not bruise/bleed easily.   Psychiatric/Behavioral:  Negative for agitation, dysphoric mood and sleep disturbance. The patient is not nervous/anxious.         Past Medical History  She has a past medical history of Asthma, BRCA1 negative (2022), BRCA2 gene mutation positive, and CHF (congestive heart failure).     Physical Exam  Patient is alert and oriented x3 and in a relaxed and appropriate mood. Her gait is steady and hand grasps are equal. Sclera is clear. The breasts are nearly symmetrical. The tissue is dense in the central quadrants and softer around without palpable abnormalities, discrete nodules or masses. The nipples appear normal. There is a superficial red raised rash mid chest. There is no cervical, supraclavicular or axillary lymphadenopathy. Heart rate and rhythm normal, S1 and S2 appreciated. The lungs are clear to auscultation  bilaterally. There is a well-healed incision upper left chest wall with cardiac device in place.     Physical Exam  Chest:            Last Recorded Vitals  Vitals:    08/05/25 0900   BP: 110/70   Pulse: 88   Resp: 16       Relevant Results   Time was spent discussing digital images of the radiology testing with the patient, results pending    Assessment/Plan   High risk surveillance care, normal clinical exam, BRCA 2 positive, VUS in POLD1 gene, family history of breast cancer, almost entirely fatty tissue, centrally dense on exam     Plan: Return in December 2025 for bilateral screening mammogram and office visit. Will continue to alternate screening ultrasound/mammogram due to ICD unless she elects for GILBERTO. Continue Tamoxifen 20mg daily.     Patient Discussion/Summary  Your clinical examination is normal. Please return in December 2025 for bilateral screening mammogram and office visit or sooner if you have any problems or concerns. Continue Tamoxifen 20mg daily.     You can see your health information, review clinical summaries from office visits & test results online when you follow your health with MY  Chart, a personal health record. To sign up go to [email or web address]. If you need assistance with signing up or trouble getting into your account call [employer] Patient Line 24/7 at [phone].    My office phone number is [phone] if you need to get in touch with me or have additional questions or concerns. Thank you for choosing [facility] and trusting me as your healthcare provider. I look forward to seeing you again at your next office visit. I am honored to be a provider on your health care team and I remain dedicated to helping you achieve your health goals.      Olive Rocha, APRN-CNP

## 2025-08-05 ENCOUNTER — HOSPITAL ENCOUNTER (OUTPATIENT)
Dept: RADIOLOGY | Facility: HOSPITAL | Age: 70
Discharge: HOME | End: 2025-08-05
Payer: MEDICARE

## 2025-08-05 ENCOUNTER — OFFICE VISIT (OUTPATIENT)
Dept: SURGICAL ONCOLOGY | Facility: HOSPITAL | Age: 70
End: 2025-08-05
Payer: MEDICARE

## 2025-08-05 VITALS
HEIGHT: 61 IN | RESPIRATION RATE: 16 BRPM | BODY MASS INDEX: 33.04 KG/M2 | DIASTOLIC BLOOD PRESSURE: 70 MMHG | SYSTOLIC BLOOD PRESSURE: 110 MMHG | HEART RATE: 88 BPM | WEIGHT: 175 LBS

## 2025-08-05 DIAGNOSIS — N64.89 OTHER SPECIFIED DISORDERS OF BREAST: ICD-10-CM

## 2025-08-05 DIAGNOSIS — Z79.810 USE OF TAMOXIFEN (NOLVADEX): Primary | ICD-10-CM

## 2025-08-05 DIAGNOSIS — Z12.31 ENCOUNTER FOR SCREENING MAMMOGRAM FOR MALIGNANT NEOPLASM OF BREAST: ICD-10-CM

## 2025-08-05 DIAGNOSIS — Z15.01 BRCA2 POSITIVE: ICD-10-CM

## 2025-08-05 DIAGNOSIS — Z12.39 BREAST CANCER SCREENING, HIGH RISK PATIENT: ICD-10-CM

## 2025-08-05 DIAGNOSIS — Z15.09 BRCA2 POSITIVE: ICD-10-CM

## 2025-08-05 PROCEDURE — 99213 OFFICE O/P EST LOW 20 MIN: CPT | Performed by: NURSE PRACTITIONER

## 2025-08-05 PROCEDURE — 1159F MED LIST DOCD IN RCRD: CPT | Performed by: NURSE PRACTITIONER

## 2025-08-05 PROCEDURE — 3008F BODY MASS INDEX DOCD: CPT | Performed by: NURSE PRACTITIONER

## 2025-08-05 PROCEDURE — 76641 ULTRASOUND BREAST COMPLETE: CPT | Mod: 50

## 2025-08-05 NOTE — PATIENT INSTRUCTIONS
Your clinical examination is normal. Please return in December 2025 for bilateral screening mammogram and office visit or sooner if you have any problems or concerns. Continue Tamoxifen 20mg daily.     You can see your health information, review clinical summaries from office visits & test results online when you follow your health with MY  Chart, a personal health record. To sign up go to [email or web address]. If you need assistance with signing up or trouble getting into your account call [employer] Patient Line 24/7 at [phone].    My office phone number is [phone] if you need to get in touch with me or have additional questions or concerns. Thank you for choosing [facility] and trusting me as your healthcare provider. I look forward to seeing you again at your next office visit. I am honored to be a provider on your health care team and I remain dedicated to helping you achieve your health goals.

## 2025-09-03 ENCOUNTER — HOSPITAL ENCOUNTER (OUTPATIENT)
Dept: CARDIOLOGY | Facility: HOSPITAL | Age: 70
Discharge: HOME | End: 2025-09-03
Payer: MEDICARE

## 2025-09-03 DIAGNOSIS — I47.20 VENTRICULAR TACHYCARDIA (PAROXYSMAL): ICD-10-CM

## 2025-09-03 DIAGNOSIS — Z95.810 PRESENCE OF AUTOMATIC CARDIOVERTER/DEFIBRILLATOR (AICD): ICD-10-CM

## 2025-09-03 PROCEDURE — 93295 DEV INTERROG REMOTE 1/2/MLT: CPT | Performed by: INTERNAL MEDICINE

## 2025-09-03 PROCEDURE — 93296 REM INTERROG EVL PM/IDS: CPT

## 2025-09-04 DIAGNOSIS — I42.0 DILATED CARDIOMYOPATHY (MULTI): ICD-10-CM

## 2025-09-04 DIAGNOSIS — I50.20 SYSTOLIC CONGESTIVE HEART FAILURE WITH REDUCED LEFT VENTRICULAR FUNCTION, NYHA CLASS 1: ICD-10-CM

## 2025-09-04 RX ORDER — CARVEDILOL 12.5 MG/1
12.5 TABLET ORAL 2 TIMES DAILY
Qty: 180 TABLET | Refills: 3 | Status: SHIPPED | OUTPATIENT
Start: 2025-09-04 | End: 2026-09-04

## 2025-12-02 ENCOUNTER — APPOINTMENT (OUTPATIENT)
Dept: CARDIOLOGY | Facility: CLINIC | Age: 70
End: 2025-12-02
Payer: MEDICARE

## 2026-04-24 ENCOUNTER — APPOINTMENT (OUTPATIENT)
Dept: CARDIOLOGY | Facility: CLINIC | Age: 71
End: 2026-04-24
Payer: MEDICARE

## (undated) DEVICE — HEMOSTAT, D-STAT FLOWABLE

## (undated) DEVICE — KIT, WRENCH, STERILE, F/LEADS

## (undated) DEVICE — ACCESSORY KIT, LEAD MDL 6056M

## (undated) DEVICE — HEMOSTAT, ARISTA, ABSORBABLE, 1 GRAMS

## (undated) DEVICE — DRESSING, AQUACEL AG, HYDROFIBER W/SILVER, 3.5 X 6 IN

## (undated) DEVICE — ENVELOPE, ANTIBACTERIAL, AIGIS RX TYRX, ABSORBABLE, LRG

## (undated) DEVICE — BLADE, CAUTERY, EXTENDED, PEAK PLASMA